# Patient Record
Sex: FEMALE | Race: WHITE | NOT HISPANIC OR LATINO | URBAN - METROPOLITAN AREA
[De-identification: names, ages, dates, MRNs, and addresses within clinical notes are randomized per-mention and may not be internally consistent; named-entity substitution may affect disease eponyms.]

---

## 2017-03-01 ENCOUNTER — EMERGENCY (EMERGENCY)
Facility: HOSPITAL | Age: 27
LOS: 1 days | Discharge: HOME | End: 2017-03-01
Attending: EMERGENCY MEDICINE | Admitting: EMERGENCY MEDICINE
Payer: MEDICAID

## 2017-03-01 VITALS
SYSTOLIC BLOOD PRESSURE: 134 MMHG | DIASTOLIC BLOOD PRESSURE: 83 MMHG | HEART RATE: 71 BPM | TEMPERATURE: 98 F | OXYGEN SATURATION: 100 % | RESPIRATION RATE: 16 BRPM

## 2017-03-01 PROCEDURE — 99283 EMERGENCY DEPT VISIT LOW MDM: CPT

## 2017-03-01 RX ORDER — FAMOTIDINE 10 MG/ML
1 INJECTION INTRAVENOUS
Qty: 20 | Refills: 0 | OUTPATIENT
Start: 2017-03-01

## 2017-03-01 RX ORDER — LIDOCAINE 4 G/100G
15 CREAM TOPICAL ONCE
Qty: 0 | Refills: 0 | Status: COMPLETED | OUTPATIENT
Start: 2017-03-01 | End: 2017-03-01

## 2017-03-01 RX ORDER — FAMOTIDINE 10 MG/ML
20 INJECTION INTRAVENOUS ONCE
Qty: 0 | Refills: 0 | Status: COMPLETED | OUTPATIENT
Start: 2017-03-01 | End: 2017-03-01

## 2017-03-01 RX ADMIN — FAMOTIDINE 20 MILLIGRAM(S): 10 INJECTION INTRAVENOUS at 16:10

## 2017-03-01 NOTE — ED ADULT TRIAGE NOTE - CHIEF COMPLAINT QUOTE
Pt c/o poor appetite x 2 weeks , dry mouth/throat , increasing thirst, and intermittent nausea  and discomfort since this week end.  Denies diarrhea, dizziness, blurry vision.  FS 89

## 2017-03-01 NOTE — ED PROVIDER NOTE - NS ED MD SCRIBE ATTENDING SCRIBE SECTIONS
REVIEW OF SYSTEMS/HISTORY OF PRESENT ILLNESS/DISPOSITION/PAST MEDICAL/SURGICAL/SOCIAL HISTORY/VITAL SIGNS( Pullset)/HIV/PHYSICAL EXAM

## 2017-03-01 NOTE — ED PROVIDER NOTE - PROGRESS NOTE DETAILS
MD CHO:  Patient seen and reassessed.  Patient symptomatically improved.  Patient able to ambulate w/o difficulty, is tolerating PO intake.  Discussed test results with patient, given copy of results. Patient verbalized understanding of hospital course and outpatient plans, has decision making capacity.  Will follow-up with primary care physician in the next 2 days; patient will call for an appointment. Will return to the ED if there is any worsening of symptoms.

## 2017-03-01 NOTE — ED PROVIDER NOTE - OBJECTIVE STATEMENT
27 yo F pt complaining of sore throat and decreased appetite x 2 weeks. States shes just not hungry. Reports her grandmother  2 weeks ago and since then has had decreased intake. Reports being stressed and anxious. Denies SI. Also c/o pain after eating - pain is in the throat/epigastric area. Also c/o chronic migraines, takes Excedrin and/or Motrin for multiple days per week x months. For epigastric/throat pain, no alleviating factors. No other associated symptoms. No emesis, no diarrhea, no fever. No sick contacts. No travel.

## 2017-03-01 NOTE — ED PROVIDER NOTE - MEDICAL DECISION MAKING DETAILS
27 yo F pt complaining of sore throat and decreased PO intake. Likely secondary to acute stress reaction due to death of family member vs reflux. Will start trial meds. PMD f/u. Likely GI referral.

## 2017-03-01 NOTE — ED PROVIDER NOTE - PLAN OF CARE
You were seen today for your abdominal pain.  It is likely due to gastritis, stomach inflammation.  Take the prescribed antacid as directed.  Avoid salty or spicy foods, alcohol, and caffeine.  Be sure to follow up with your primary care physician in 2-3 days for re-evaluation.  RETURN TO THE EMERGENCY DEPARTMENT IMMEDIATELY FOR SEVERE PAIN, IF YOU CANNOT EAT OR DRINK, DEVELOP CHEST PAIN, TROUBLE BREATHING, OR FOR ANY OTHER CONCERN.

## 2017-03-01 NOTE — ED PROVIDER NOTE - CARE PLAN
Principal Discharge DX:	Gastritis  Instructions for follow-up, activity and diet:	You were seen today for your abdominal pain.  It is likely due to gastritis, stomach inflammation.  Take the prescribed antacid as directed.  Avoid salty or spicy foods, alcohol, and caffeine.  Be sure to follow up with your primary care physician in 2-3 days for re-evaluation.  RETURN TO THE EMERGENCY DEPARTMENT IMMEDIATELY FOR SEVERE PAIN, IF YOU CANNOT EAT OR DRINK, DEVELOP CHEST PAIN, TROUBLE BREATHING, OR FOR ANY OTHER CONCERN.

## 2017-08-30 ENCOUNTER — EMERGENCY (EMERGENCY)
Facility: HOSPITAL | Age: 27
LOS: 1 days | Discharge: HOME | End: 2017-08-30
Attending: EMERGENCY MEDICINE | Admitting: EMERGENCY MEDICINE
Payer: COMMERCIAL

## 2017-08-30 VITALS
RESPIRATION RATE: 17 BRPM | SYSTOLIC BLOOD PRESSURE: 132 MMHG | OXYGEN SATURATION: 99 % | HEART RATE: 78 BPM | TEMPERATURE: 99 F | DIASTOLIC BLOOD PRESSURE: 74 MMHG

## 2017-08-30 VITALS
SYSTOLIC BLOOD PRESSURE: 158 MMHG | DIASTOLIC BLOOD PRESSURE: 100 MMHG | RESPIRATION RATE: 18 BRPM | OXYGEN SATURATION: 99 % | TEMPERATURE: 98 F | HEART RATE: 90 BPM

## 2017-08-30 DIAGNOSIS — Z90.49 ACQUIRED ABSENCE OF OTHER SPECIFIED PARTS OF DIGESTIVE TRACT: Chronic | ICD-10-CM

## 2017-08-30 LAB
ALBUMIN SERPL ELPH-MCNC: 4.4 G/DL — SIGNIFICANT CHANGE UP (ref 3.3–5)
ALP SERPL-CCNC: 72 U/L — SIGNIFICANT CHANGE UP (ref 40–120)
ALT FLD-CCNC: 19 U/L — SIGNIFICANT CHANGE UP (ref 4–33)
AST SERPL-CCNC: 18 U/L — SIGNIFICANT CHANGE UP (ref 4–32)
BASOPHILS # BLD AUTO: 0.06 K/UL — SIGNIFICANT CHANGE UP (ref 0–0.2)
BASOPHILS NFR BLD AUTO: 0.4 % — SIGNIFICANT CHANGE UP (ref 0–2)
BILIRUB SERPL-MCNC: 0.3 MG/DL — SIGNIFICANT CHANGE UP (ref 0.2–1.2)
BUN SERPL-MCNC: 8 MG/DL — SIGNIFICANT CHANGE UP (ref 7–23)
CALCIUM SERPL-MCNC: 9.7 MG/DL — SIGNIFICANT CHANGE UP (ref 8.4–10.5)
CHLORIDE SERPL-SCNC: 101 MMOL/L — SIGNIFICANT CHANGE UP (ref 98–107)
CO2 SERPL-SCNC: 25 MMOL/L — SIGNIFICANT CHANGE UP (ref 22–31)
CREAT SERPL-MCNC: 0.74 MG/DL — SIGNIFICANT CHANGE UP (ref 0.5–1.3)
EOSINOPHIL # BLD AUTO: 0.14 K/UL — SIGNIFICANT CHANGE UP (ref 0–0.5)
EOSINOPHIL NFR BLD AUTO: 1 % — SIGNIFICANT CHANGE UP (ref 0–6)
GLUCOSE SERPL-MCNC: 85 MG/DL — SIGNIFICANT CHANGE UP (ref 70–99)
HCT VFR BLD CALC: 44.5 % — SIGNIFICANT CHANGE UP (ref 34.5–45)
HGB BLD-MCNC: 14.7 G/DL — SIGNIFICANT CHANGE UP (ref 11.5–15.5)
IMM GRANULOCYTES # BLD AUTO: 0.12 # — SIGNIFICANT CHANGE UP
IMM GRANULOCYTES NFR BLD AUTO: 0.8 % — SIGNIFICANT CHANGE UP (ref 0–1.5)
LYMPHOCYTES # BLD AUTO: 22.1 % — SIGNIFICANT CHANGE UP (ref 13–44)
LYMPHOCYTES # BLD AUTO: 3.2 K/UL — SIGNIFICANT CHANGE UP (ref 1–3.3)
MCHC RBC-ENTMCNC: 26.7 PG — LOW (ref 27–34)
MCHC RBC-ENTMCNC: 33 % — SIGNIFICANT CHANGE UP (ref 32–36)
MCV RBC AUTO: 80.9 FL — SIGNIFICANT CHANGE UP (ref 80–100)
MONOCYTES # BLD AUTO: 0.61 K/UL — SIGNIFICANT CHANGE UP (ref 0–0.9)
MONOCYTES NFR BLD AUTO: 4.2 % — SIGNIFICANT CHANGE UP (ref 2–14)
NEUTROPHILS # BLD AUTO: 10.34 K/UL — HIGH (ref 1.8–7.4)
NEUTROPHILS NFR BLD AUTO: 71.5 % — SIGNIFICANT CHANGE UP (ref 43–77)
NRBC # FLD: 0 — SIGNIFICANT CHANGE UP
PLATELET # BLD AUTO: 317 K/UL — SIGNIFICANT CHANGE UP (ref 150–400)
PMV BLD: 10.3 FL — SIGNIFICANT CHANGE UP (ref 7–13)
POTASSIUM SERPL-MCNC: 4.5 MMOL/L — SIGNIFICANT CHANGE UP (ref 3.5–5.3)
POTASSIUM SERPL-SCNC: 4.5 MMOL/L — SIGNIFICANT CHANGE UP (ref 3.5–5.3)
PROT SERPL-MCNC: 7.3 G/DL — SIGNIFICANT CHANGE UP (ref 6–8.3)
RBC # BLD: 5.5 M/UL — HIGH (ref 3.8–5.2)
RBC # FLD: 13.1 % — SIGNIFICANT CHANGE UP (ref 10.3–14.5)
SODIUM SERPL-SCNC: 140 MMOL/L — SIGNIFICANT CHANGE UP (ref 135–145)
TSH SERPL-MCNC: 1.11 UIU/ML — SIGNIFICANT CHANGE UP (ref 0.27–4.2)
WBC # BLD: 14.47 K/UL — HIGH (ref 3.8–10.5)
WBC # FLD AUTO: 14.47 K/UL — HIGH (ref 3.8–10.5)

## 2017-08-30 PROCEDURE — 93010 ELECTROCARDIOGRAM REPORT: CPT

## 2017-08-30 PROCEDURE — 99285 EMERGENCY DEPT VISIT HI MDM: CPT | Mod: 25

## 2017-08-30 RX ORDER — KETOROLAC TROMETHAMINE 30 MG/ML
15 SYRINGE (ML) INJECTION ONCE
Qty: 0 | Refills: 0 | Status: DISCONTINUED | OUTPATIENT
Start: 2017-08-30 | End: 2017-08-30

## 2017-08-30 RX ORDER — METOCLOPRAMIDE HCL 10 MG
10 TABLET ORAL ONCE
Qty: 0 | Refills: 0 | Status: COMPLETED | OUTPATIENT
Start: 2017-08-30 | End: 2017-08-30

## 2017-08-30 RX ORDER — SODIUM CHLORIDE 9 MG/ML
1000 INJECTION INTRAMUSCULAR; INTRAVENOUS; SUBCUTANEOUS ONCE
Qty: 0 | Refills: 0 | Status: COMPLETED | OUTPATIENT
Start: 2017-08-30 | End: 2017-08-30

## 2017-08-30 RX ADMIN — SODIUM CHLORIDE 1000 MILLILITER(S): 9 INJECTION INTRAMUSCULAR; INTRAVENOUS; SUBCUTANEOUS at 19:28

## 2017-08-30 RX ADMIN — Medication 10 MILLIGRAM(S): at 19:28

## 2017-08-30 NOTE — ED PROVIDER NOTE - OBJECTIVE STATEMENT
18:36 Emery att: 27F c/o 1 wk ha, dizzy, lighthead, wk, ha and palpitations. Past week 1 dizziness, specifies lighthead not like vertigo which she had in past. Also notes generalized weakness, intermitt frontal ha which she points to max sinus. Past one week occ non-exertional palpitations. Denies fever, chills, n, v, sob, exertional sx. HEART zero PERC zero SMOKING denies FamHx neg mi < 40, neg sudden death. Denies pleurisy, hemoptysis, tachypnea, fever, cough, chills, rhinorrhea, sneezing. PMH pcos, gallstones, anxiety PSH pamela MED x ALL nkda  denies

## 2017-08-30 NOTE — ED ADULT TRIAGE NOTE - CHIEF COMPLAINT QUOTE
pt presents c/o generalized weakness, dizzy and imbalanced, with feelings of anxiety, and shaky with intermittent palpitations and sob x1 week. pt states "I think my sugar is low". pt reports feeling off balance x years, and tested negative for vertigo. now endorses she is unable to work or function in her daily life. denies any additional symptoms. FS: 74 PMHX: PCOS, degenerative joint disease in neck

## 2017-08-30 NOTE — ED PROVIDER NOTE - MEDICAL DECISION MAKING DETAILS
Palpitations, ekg sinus nl axis neg st abnl PLAN check electrolytes and tsh  HA, neg sinus tenderness, cranial nerves intact PLAN reglan, toradol, fluids

## 2017-09-03 ENCOUNTER — TRANSCRIPTION ENCOUNTER (OUTPATIENT)
Age: 27
End: 2017-09-03

## 2018-02-08 ENCOUNTER — TRANSCRIPTION ENCOUNTER (OUTPATIENT)
Age: 28
End: 2018-02-08

## 2018-02-08 ENCOUNTER — EMERGENCY (EMERGENCY)
Facility: HOSPITAL | Age: 28
LOS: 1 days | Discharge: HOME | End: 2018-02-08
Attending: EMERGENCY MEDICINE | Admitting: EMERGENCY MEDICINE
Payer: COMMERCIAL

## 2018-02-08 VITALS
DIASTOLIC BLOOD PRESSURE: 85 MMHG | HEART RATE: 86 BPM | SYSTOLIC BLOOD PRESSURE: 151 MMHG | OXYGEN SATURATION: 99 % | TEMPERATURE: 98 F | RESPIRATION RATE: 20 BRPM

## 2018-02-08 VITALS
TEMPERATURE: 98 F | DIASTOLIC BLOOD PRESSURE: 92 MMHG | HEART RATE: 84 BPM | OXYGEN SATURATION: 100 % | SYSTOLIC BLOOD PRESSURE: 157 MMHG | RESPIRATION RATE: 15 BRPM

## 2018-02-08 DIAGNOSIS — Z90.49 ACQUIRED ABSENCE OF OTHER SPECIFIED PARTS OF DIGESTIVE TRACT: Chronic | ICD-10-CM

## 2018-02-08 PROCEDURE — 71046 X-RAY EXAM CHEST 2 VIEWS: CPT | Mod: 26

## 2018-02-08 PROCEDURE — 99284 EMERGENCY DEPT VISIT MOD MDM: CPT

## 2018-02-08 RX ORDER — IBUPROFEN 200 MG
600 TABLET ORAL ONCE
Qty: 0 | Refills: 0 | Status: COMPLETED | OUTPATIENT
Start: 2018-02-08 | End: 2018-02-08

## 2018-02-08 NOTE — ED PROVIDER NOTE - OBJECTIVE STATEMENT
27F pmh PCOS p/w 1 week of chest tightness and feelins shortness of breath and 2 days of episodes of intermittent "achey" chest pains.  Pain is not pleuritic.  Pt notices increased discomfort when laying on left side.  Denies recent travel.  No hormone use.  Denies fever, chills, abd pain, nausea/vomiting/diarrhea, hemoptysis.  No leg swelling.  - Edith Whitt, DO 27F pmh PCOS p/w 1 week of chest tightness and feeling shortness of breath d/t pain, and 2 days of episodes of intermittent "achey" chest pains.  Pain is not pleuritic.  Pt notices increased discomfort when laying on left side and turning.  Denies recent travel.  No hormone use.  Denies fever, chills, abd pain, nausea/vomiting/diarrhea, hemoptysis.  No leg swelling. No recent injuries.  Well appearing.

## 2018-02-08 NOTE — ED PROVIDER NOTE - CARE PLAN
Principal Discharge DX:	Costochondral chest pain  Assessment and plan of treatment:	- Take Motrin 400-600mg every 6 hrs as needed for pain. Take with food

## 2018-02-08 NOTE — ED ADULT TRIAGE NOTE - CHIEF COMPLAINT QUOTE
Pt with co sob since last week with pain under left breast area since yesterday pain radiated to upper back. pt states feels chest tightness.

## 2018-02-08 NOTE — ED ADULT NURSE NOTE - OBJECTIVE STATEMENT
pt. received in room 26 ,, A&Ox3 w/ no PMH c/o left sided chest pain with sob for a couple of days. Pt. appears calm , with even non-labored resp. Pt. Vitals as noted, and in no acute distress. As per MD no labs needed at this moment. Awaiting x-ray. Will continue to monitor while in the ED.

## 2018-02-08 NOTE — ED PROVIDER NOTE - MEDICAL DECISION MAKING DETAILS
27F pmh pcos p/w chest tightness and shortness of breath for 1 week.  NSR of EKG.  PERC negative.  Will obtain cxr and reassess.  - Edith Whitt DO 27F pmh pcos p/w pain c/w constochondritis vs other MSK pain.  No known cardiac risk factors, PERC negative.  Will ECG, provide sx tx and dx.

## 2018-02-08 NOTE — ED PROVIDER NOTE - ATTENDING CONTRIBUTION TO CARE
Attending Attestation: Dr. Macedo  I have personally performed a history and physical examination of the patient and discussed management with the resident as well as the patient.  I reviewed the resident's note and agree with the documented findings and plan of care.  I have authored and modified critical sections of the Provider Note, including but not limited to HPI, Physical Exam and MDM. 27F pmh pcos p/w pain c/w constochondritis vs other MSK pain.  No known cardiac risk factors, PERC negative.  Will ECG, provide sx tx and dx.

## 2018-02-08 NOTE — ED PROVIDER NOTE - CARDIAC, MLM
Normal rate, regular rhythm.  Heart sounds S1, S2.  No murmurs, rubs or gallops. Normal rate, regular rhythm.  Heart sounds S1, S2.  No murmurs, rubs or gallops.  TTP left sternal border.

## 2018-02-09 RX ADMIN — Medication 600 MILLIGRAM(S): at 00:00

## 2020-02-26 ENCOUNTER — HOSPITAL ENCOUNTER (EMERGENCY)
Facility: HOSPITAL | Age: 30
Discharge: HOME | End: 2020-02-26
Attending: EMERGENCY MEDICINE | Admitting: EMERGENCY MEDICINE

## 2020-02-26 DIAGNOSIS — R03.0 TRANSIENT HYPERTENSION: ICD-10-CM

## 2020-02-26 DIAGNOSIS — G43.009 ATYPICAL MIGRAINE: Primary | ICD-10-CM

## 2020-02-26 LAB
ALBUMIN SERPL BCP-MCNC: 3.7 G/DL (ref 3.5–5)
ALP SERPL-CCNC: 85 U/L (ref 46–116)
ALT SERPL W P-5'-P-CCNC: 43 U/L (ref 12–78)
ANION GAP SERPL CALCULATED.3IONS-SCNC: 10 MMOL/L (ref 4–13)
AST SERPL W P-5'-P-CCNC: 25 U/L (ref 5–45)
BACTERIA UR QL AUTO: ABNORMAL /HPF
BASOPHILS # BLD AUTO: 0.06 THOUSANDS/ΜL (ref 0–0.1)
BASOPHILS NFR BLD AUTO: 1 % (ref 0–1)
BILIRUB SERPL-MCNC: 0.21 MG/DL (ref 0.2–1)
BILIRUB UR QL STRIP: NEGATIVE
BUN SERPL-MCNC: 9 MG/DL (ref 5–25)
CALCIUM SERPL-MCNC: 9.1 MG/DL (ref 8.3–10.1)
CHLORIDE SERPL-SCNC: 102 MMOL/L (ref 100–108)
CLARITY UR: CLEAR
CO2 SERPL-SCNC: 27 MMOL/L (ref 21–32)
COLOR UR: YELLOW
CREAT SERPL-MCNC: 0.89 MG/DL (ref 0.6–1.3)
EOSINOPHIL # BLD AUTO: 0.13 THOUSAND/ΜL (ref 0–0.61)
EOSINOPHIL NFR BLD AUTO: 1 % (ref 0–6)
ERYTHROCYTE [DISTWIDTH] IN BLOOD BY AUTOMATED COUNT: 12.8 % (ref 11.6–15.1)
EXT PREG TEST URINE: NEGATIVE
EXT. CONTROL ED NAV: NORMAL
GFR SERPL CREATININE-BSD FRML MDRD: 88 ML/MIN/1.73SQ M
GLUCOSE SERPL-MCNC: 190 MG/DL (ref 65–140)
GLUCOSE UR STRIP-MCNC: ABNORMAL MG/DL
HCT VFR BLD AUTO: 43.9 % (ref 34.8–46.1)
HGB BLD-MCNC: 14.6 G/DL (ref 11.5–15.4)
HGB UR QL STRIP.AUTO: ABNORMAL
IMM GRANULOCYTES # BLD AUTO: 0.1 THOUSAND/UL (ref 0–0.2)
IMM GRANULOCYTES NFR BLD AUTO: 1 % (ref 0–2)
KETONES UR STRIP-MCNC: NEGATIVE MG/DL
LEUKOCYTE ESTERASE UR QL STRIP: NEGATIVE
LYMPHOCYTES # BLD AUTO: 3.55 THOUSANDS/ΜL (ref 0.6–4.47)
LYMPHOCYTES NFR BLD AUTO: 30 % (ref 14–44)
MCH RBC QN AUTO: 27.2 PG (ref 26.8–34.3)
MCHC RBC AUTO-ENTMCNC: 33.3 G/DL (ref 31.4–37.4)
MCV RBC AUTO: 82 FL (ref 82–98)
MONOCYTES # BLD AUTO: 0.47 THOUSAND/ΜL (ref 0.17–1.22)
MONOCYTES NFR BLD AUTO: 4 % (ref 4–12)
NEUTROPHILS # BLD AUTO: 7.58 THOUSANDS/ΜL (ref 1.85–7.62)
NEUTS SEG NFR BLD AUTO: 63 % (ref 43–75)
NITRITE UR QL STRIP: NEGATIVE
NON-SQ EPI CELLS URNS QL MICRO: ABNORMAL /HPF
NRBC BLD AUTO-RTO: 0 /100 WBCS
PH UR STRIP.AUTO: 6 [PH]
PLATELET # BLD AUTO: 306 THOUSANDS/UL (ref 149–390)
PMV BLD AUTO: 9.8 FL (ref 8.9–12.7)
POTASSIUM SERPL-SCNC: 3.7 MMOL/L (ref 3.5–5.3)
PROT SERPL-MCNC: 7.7 G/DL (ref 6.4–8.2)
PROT UR STRIP-MCNC: NEGATIVE MG/DL
RBC # BLD AUTO: 5.36 MILLION/UL (ref 3.81–5.12)
RBC #/AREA URNS AUTO: ABNORMAL /HPF
SODIUM SERPL-SCNC: 139 MMOL/L (ref 136–145)
SP GR UR STRIP.AUTO: >=1.03 (ref 1–1.03)
TROPONIN I SERPL-MCNC: <0.02 NG/ML
UROBILINOGEN UR QL STRIP.AUTO: 0.2 E.U./DL
WBC # BLD AUTO: 11.89 THOUSAND/UL (ref 4.31–10.16)
WBC #/AREA URNS AUTO: ABNORMAL /HPF

## 2020-02-26 PROCEDURE — 81025 URINE PREGNANCY TEST: CPT | Performed by: EMERGENCY MEDICINE

## 2020-02-26 PROCEDURE — 36415 COLL VENOUS BLD VENIPUNCTURE: CPT

## 2020-02-26 PROCEDURE — 80053 COMPREHEN METABOLIC PANEL: CPT | Performed by: EMERGENCY MEDICINE

## 2020-02-26 PROCEDURE — 84484 ASSAY OF TROPONIN QUANT: CPT | Performed by: EMERGENCY MEDICINE

## 2020-02-26 PROCEDURE — 99284 EMERGENCY DEPT VISIT MOD MDM: CPT

## 2020-02-26 PROCEDURE — 99284 EMERGENCY DEPT VISIT MOD MDM: CPT | Performed by: EMERGENCY MEDICINE

## 2020-02-26 PROCEDURE — 85025 COMPLETE CBC W/AUTO DIFF WBC: CPT | Performed by: EMERGENCY MEDICINE

## 2020-02-26 PROCEDURE — 81001 URINALYSIS AUTO W/SCOPE: CPT | Performed by: EMERGENCY MEDICINE

## 2020-02-26 PROCEDURE — 93005 ELECTROCARDIOGRAM TRACING: CPT

## 2020-02-27 VITALS
HEART RATE: 82 BPM | SYSTOLIC BLOOD PRESSURE: 138 MMHG | TEMPERATURE: 98.5 F | WEIGHT: 293 LBS | BODY MASS INDEX: 44.41 KG/M2 | RESPIRATION RATE: 20 BRPM | HEIGHT: 68 IN | DIASTOLIC BLOOD PRESSURE: 90 MMHG | OXYGEN SATURATION: 100 %

## 2020-02-27 NOTE — ED PROVIDER NOTES
History  Chief Complaint   Patient presents with    Hypertension     Presents for eval of htn assessed via Urgent care pta (164/100) - reports intermittent HA w/ visual disturbance/lightheadedness/blurred vision  Hx migraines, but "feels different"  Additional c/o "lump" to right chest/upper back of unknown etiology  History provided by:  Patient   used: No    33 y/o female with hx of migraines sent from urgent care for further eval of headache and HTN  Patient reported about a day of intermittent headache, some blurred vision and lightheadedness  Moderate intensity, diffuse pain  No N/V, fever, chills, neck pain, trauma  Notes somewhat different than her usual migraines  Noted to be hypertensive at urgent care  Hypertensive on arrival here  Likely secondary to headache given age and prior medical history  Will r/o end-organ damage, re-eval     None       Past Medical History:   Diagnosis Date    Migraine     PCOS (polycystic ovarian syndrome)        Past Surgical History:   Procedure Laterality Date    CHOLECYSTECTOMY         History reviewed  No pertinent family history  I have reviewed and agree with the history as documented  E-Cigarette/Vaping     E-Cigarette/Vaping Substances    Nicotine No     Flavoring No      Social History     Tobacco Use    Smoking status: Never Smoker    Smokeless tobacco: Never Used   Substance Use Topics    Alcohol use: Never     Frequency: Never    Drug use: Never       Review of Systems   Constitutional: Negative for activity change, appetite change, fatigue and fever  Eyes: Positive for photophobia and visual disturbance  Respiratory: Negative for chest tightness and shortness of breath  Cardiovascular: Negative for chest pain and palpitations  Gastrointestinal: Negative for nausea and vomiting  Musculoskeletal: Negative for back pain, neck pain and neck stiffness  Skin: Negative for color change     Neurological: Positive for light-headedness and headaches  Negative for dizziness and weakness  Psychiatric/Behavioral: Negative for confusion  All other systems reviewed and are negative  Physical Exam  Physical Exam   Constitutional: She is oriented to person, place, and time  She appears well-developed and well-nourished  No distress  HENT:   Head: Normocephalic  Mouth/Throat: Oropharynx is clear and moist    Eyes: Pupils are equal, round, and reactive to light  EOM are normal    Neck: Normal range of motion  Neck supple  Cardiovascular: Normal rate and regular rhythm  Pulmonary/Chest: Effort normal  No respiratory distress  Neurological: She is alert and oriented to person, place, and time  No cranial nerve deficit or sensory deficit  She exhibits normal muscle tone  Coordination normal    Skin: Skin is warm and dry  Psychiatric: She has a normal mood and affect  Her behavior is normal    Nursing note and vitals reviewed        Vital Signs  ED Triage Vitals [02/26/20 2031]   Temperature Pulse Respirations Blood Pressure SpO2   98 5 °F (36 9 °C) (!) 110 20 (!) 201/101 95 %      Temp Source Heart Rate Source Patient Position - Orthostatic VS BP Location FiO2 (%)   Oral Monitor Sitting Left arm --      Pain Score       8           Vitals:    02/26/20 2031 02/26/20 2146 02/26/20 2213 02/26/20 2350   BP: (!) 201/101 (!) 185/92 (!) 136/110 138/90   Pulse: (!) 110 101 88 82   Patient Position - Orthostatic VS: Sitting Sitting Sitting Lying         Visual Acuity      ED Medications  Medications - No data to display    Diagnostic Studies  Results Reviewed     Procedure Component Value Units Date/Time    Urine Microscopic [[id number]]  (Abnormal) Collected:  02/26/20 2201    Lab Status:  Final result Specimen:  Urine, Clean Catch Updated:  02/26/20 2231     RBC, UA 0-1 /hpf      WBC, UA 1-2 /hpf      Epithelial Cells Occasional /hpf      Bacteria, UA None Seen /hpf     Troponin I [[id number]]  (Normal) Collected:  02/26/20 2159 Lab Status:  Final result Specimen:  Blood from Arm, Right Updated:  02/26/20 2226     Troponin I <0 02 ng/mL     Comprehensive metabolic panel [[id number]]  (Abnormal) Collected:  02/26/20 2159    Lab Status:  Final result Specimen:  Blood from Arm, Right Updated:  02/26/20 2226     Sodium 139 mmol/L      Potassium 3 7 mmol/L      Chloride 102 mmol/L      CO2 27 mmol/L      ANION GAP 10 mmol/L      BUN 9 mg/dL      Creatinine 0 89 mg/dL      Glucose 190 mg/dL      Calcium 9 1 mg/dL      AST 25 U/L      ALT 43 U/L      Alkaline Phosphatase 85 U/L      Total Protein 7 7 g/dL      Albumin 3 7 g/dL      Total Bilirubin 0 21 mg/dL      eGFR 88 ml/min/1 73sq m     Narrative:       National Kidney Disease Foundation guidelines for Chronic Kidney Disease (CKD):     Stage 1 with normal or high GFR (GFR > 90 mL/min/1 73 square meters)    Stage 2 Mild CKD (GFR = 60-89 mL/min/1 73 square meters)    Stage 3A Moderate CKD (GFR = 45-59 mL/min/1 73 square meters)    Stage 3B Moderate CKD (GFR = 30-44 mL/min/1 73 square meters)    Stage 4 Severe CKD (GFR = 15-29 mL/min/1 73 square meters)    Stage 5 End Stage CKD (GFR <15 mL/min/1 73 square meters)  Note: GFR calculation is accurate only with a steady state creatinine    UA w Reflex to Microscopic w Reflex to Culture [[id number]]  (Abnormal) Collected:  02/26/20 2201    Lab Status:  Final result Specimen:  Urine, Clean Catch Updated:  02/26/20 2213     Color, UA Yellow     Clarity, UA Clear     Specific Gravity, UA >=1 030     pH, UA 6 0     Leukocytes, UA Negative     Nitrite, UA Negative     Protein, UA Negative mg/dl      Glucose,  (1/10%) mg/dl      Ketones, UA Negative mg/dl      Urobilinogen, UA 0 2 E U /dl      Bilirubin, UA Negative     Blood, UA Small    CBC and differential [[id number]]  (Abnormal) Collected:  02/26/20 2200    Lab Status:  Final result Specimen:  Blood from Arm, Right Updated:  02/26/20 2205     WBC 11 89 Thousand/uL      RBC 5 36 Million/uL Hemoglobin 14 6 g/dL      Hematocrit 43 9 %      MCV 82 fL      MCH 27 2 pg      MCHC 33 3 g/dL      RDW 12 8 %      MPV 9 8 fL      Platelets 582 Thousands/uL      nRBC 0 /100 WBCs      Neutrophils Relative 63 %      Immat GRANS % 1 %      Lymphocytes Relative 30 %      Monocytes Relative 4 %      Eosinophils Relative 1 %      Basophils Relative 1 %      Neutrophils Absolute 7 58 Thousands/µL      Immature Grans Absolute 0 10 Thousand/uL      Lymphocytes Absolute 3 55 Thousands/µL      Monocytes Absolute 0 47 Thousand/µL      Eosinophils Absolute 0 13 Thousand/µL      Basophils Absolute 0 06 Thousands/µL     POCT pregnancy, urine [[id number]]  (Normal) Resulted:  02/26/20 2200    Lab Status:  Final result Updated:  02/26/20 2200     EXT PREG TEST UR (Ref: Negative) negative     Control valid                 No orders to display              Procedures  ECG 12 Lead Documentation Only  Date/Time: 2/26/2020 11:47 PM  Performed by: Hany Haddad MD  Authorized by: Hany Haddad MD     Indications / Diagnosis:  HTN  ECG reviewed by me, the ED Provider: yes    Patient location:  ED  Previous ECG:     Previous ECG:  Unavailable  Rate:     ECG rate:  82  Rhythm:     Rhythm: sinus rhythm    Ectopy:     Ectopy: none    QRS:     QRS axis:  Normal  Conduction:     Conduction: normal    ST segments:     ST segments:  Normal  T waves:     T waves: normal               ED Course                               MDM  Number of Diagnoses or Management Options  Atypical migraine: new and requires workup  Transient hypertension: new and requires workup  Diagnosis management comments: 35 y/o female with intermittent headache somewhat different than her usual migraine and hypertensive reading at urgent care referred for further eval  Doing well here  Spontaneous improvement in BP  Headache minimal  Patient feels well for discharge  No evidence of end organ damage         Amount and/or Complexity of Data Reviewed  Clinical lab tests: ordered and reviewed  Independent visualization of images, tracings, or specimens: yes    Patient Progress  Patient progress: improved        Disposition  Final diagnoses:   Atypical migraine   Transient hypertension     Time reflects when diagnosis was documented in both MDM as applicable and the Disposition within this note     Time User Action Codes Description Comment    2/26/2020 11:39 PM Shana Payan Add [G43 009] Atypical migraine     2/26/2020 11:39 PM Annmarie Tuttle MANE Add [R03 0] Transient hypertension       ED Disposition     ED Disposition Condition Date/Time Comment    Discharge Stable Wed Feb 26, 2020 11:39 PM Kathy Diaz discharge to home/self care  Follow-up Information     Follow up With Specialties Details Why Contact Info Additional [address] [facility]   [address] [facility] [address]  [address] [facility] [address], [place], [place] [address]   [phone]          There are no discharge medications for this patient  No discharge procedures on file      PDMP Review     None          ED Provider  Electronically Signed by           Anjel Newman MD  03/14/20 5195

## 2020-02-29 LAB
ATRIAL RATE: 82 BPM
P AXIS: 22 DEGREES
PR INTERVAL: 172 MS
QRS AXIS: 15 DEGREES
QRSD INTERVAL: 70 MS
QT INTERVAL: 344 MS
QTC INTERVAL: 401 MS
T WAVE AXIS: 18 DEGREES
VENTRICULAR RATE: 82 BPM

## 2020-02-29 PROCEDURE — 93010 ELECTROCARDIOGRAM REPORT: CPT | Performed by: INTERNAL MEDICINE

## 2020-04-24 ENCOUNTER — OFFICE VISIT (OUTPATIENT)
Dept: FAMILY MEDICINE CLINIC | Facility: CLINIC | Age: 30
End: 2020-04-24
Payer: COMMERCIAL

## 2020-04-24 VITALS
HEART RATE: 98 BPM | TEMPERATURE: 99.4 F | DIASTOLIC BLOOD PRESSURE: 92 MMHG | OXYGEN SATURATION: 98 % | HEIGHT: 69 IN | SYSTOLIC BLOOD PRESSURE: 140 MMHG | BODY MASS INDEX: 43.4 KG/M2 | RESPIRATION RATE: 20 BRPM | WEIGHT: 293 LBS

## 2020-04-24 DIAGNOSIS — Z00.00 ANNUAL PHYSICAL EXAM: Primary | ICD-10-CM

## 2020-04-24 DIAGNOSIS — R42 VERTIGO: ICD-10-CM

## 2020-04-24 DIAGNOSIS — I10 ESSENTIAL HYPERTENSION: ICD-10-CM

## 2020-04-24 DIAGNOSIS — R42 DIZZINESS: ICD-10-CM

## 2020-04-24 DIAGNOSIS — E66.01 MORBID (SEVERE) OBESITY DUE TO EXCESS CALORIES (HCC): ICD-10-CM

## 2020-04-24 DIAGNOSIS — K21.9 GASTROESOPHAGEAL REFLUX DISEASE WITHOUT ESOPHAGITIS: ICD-10-CM

## 2020-04-24 DIAGNOSIS — R40.0 DAYTIME SLEEPINESS: ICD-10-CM

## 2020-04-24 PROBLEM — G43.019 INTRACTABLE MIGRAINE WITHOUT AURA AND WITHOUT STATUS MIGRAINOSUS: Status: ACTIVE | Noted: 2020-04-24

## 2020-04-24 PROBLEM — E28.2 PCOS (POLYCYSTIC OVARIAN SYNDROME): Status: ACTIVE | Noted: 2020-04-24

## 2020-04-24 PROCEDURE — 4010F ACE/ARB THERAPY RXD/TAKEN: CPT | Performed by: INTERNAL MEDICINE

## 2020-04-24 PROCEDURE — 99385 PREV VISIT NEW AGE 18-39: CPT | Performed by: FAMILY MEDICINE

## 2020-04-24 RX ORDER — OLMESARTAN MEDOXOMIL 5 MG/1
5 TABLET ORAL DAILY
Qty: 30 TABLET | Refills: 0 | Status: SHIPPED | OUTPATIENT
Start: 2020-04-24 | End: 2022-03-02 | Stop reason: ALTCHOICE

## 2020-04-24 RX ORDER — MECLIZINE HYDROCHLORIDE 25 MG/1
25 TABLET ORAL 3 TIMES DAILY PRN
Qty: 90 TABLET | Refills: 0 | Status: SHIPPED | OUTPATIENT
Start: 2020-04-24 | End: 2022-03-02 | Stop reason: ALTCHOICE

## 2020-04-24 RX ORDER — PANTOPRAZOLE SODIUM 20 MG/1
20 TABLET, DELAYED RELEASE ORAL
Qty: 30 TABLET | Refills: 5 | Status: SHIPPED | OUTPATIENT
Start: 2020-04-24

## 2020-04-27 LAB
ALBUMIN SERPL-MCNC: 4.3 G/DL (ref 3.9–5)
ALBUMIN/GLOB SERPL: 1.7 {RATIO} (ref 1.2–2.2)
ALP SERPL-CCNC: 71 IU/L (ref 39–117)
ALT SERPL-CCNC: 24 IU/L (ref 0–32)
AST SERPL-CCNC: 23 IU/L (ref 0–40)
BASOPHILS # BLD AUTO: 0.1 X10E3/UL (ref 0–0.2)
BASOPHILS NFR BLD AUTO: 1 %
BILIRUB SERPL-MCNC: 0.2 MG/DL (ref 0–1.2)
BUN SERPL-MCNC: 11 MG/DL (ref 6–20)
BUN/CREAT SERPL: 16 (ref 9–23)
CALCIUM SERPL-MCNC: 9.5 MG/DL (ref 8.7–10.2)
CHLORIDE SERPL-SCNC: 99 MMOL/L (ref 96–106)
CHOLEST SERPL-MCNC: 209 MG/DL (ref 100–199)
CO2 SERPL-SCNC: 24 MMOL/L (ref 20–29)
CREAT SERPL-MCNC: 0.67 MG/DL (ref 0.57–1)
EOSINOPHIL # BLD AUTO: 0.2 X10E3/UL (ref 0–0.4)
EOSINOPHIL NFR BLD AUTO: 2 %
ERYTHROCYTE [DISTWIDTH] IN BLOOD BY AUTOMATED COUNT: 13.3 % (ref 11.7–15.4)
GLOBULIN SER-MCNC: 2.6 G/DL (ref 1.5–4.5)
GLUCOSE SERPL-MCNC: 163 MG/DL (ref 65–99)
HBA1C MFR BLD: 8.5 % (ref 4.8–5.6)
HCT VFR BLD AUTO: 43.7 % (ref 34–46.6)
HDLC SERPL-MCNC: 45 MG/DL
HGB BLD-MCNC: 14 G/DL (ref 11.1–15.9)
IMM GRANULOCYTES # BLD: 0.2 X10E3/UL (ref 0–0.1)
IMM GRANULOCYTES NFR BLD: 2 %
LDLC SERPL CALC-MCNC: 140 MG/DL (ref 0–99)
LYMPHOCYTES # BLD AUTO: 4.1 X10E3/UL (ref 0.7–3.1)
LYMPHOCYTES NFR BLD AUTO: 37 %
MCH RBC QN AUTO: 26.5 PG (ref 26.6–33)
MCHC RBC AUTO-ENTMCNC: 32 G/DL (ref 31.5–35.7)
MCV RBC AUTO: 83 FL (ref 79–97)
MONOCYTES # BLD AUTO: 0.5 X10E3/UL (ref 0.1–0.9)
MONOCYTES NFR BLD AUTO: 4 %
NEUTROPHILS # BLD AUTO: 6.1 X10E3/UL (ref 1.4–7)
NEUTROPHILS NFR BLD AUTO: 54 %
PLATELET # BLD AUTO: 291 X10E3/UL (ref 150–450)
POTASSIUM SERPL-SCNC: 3.9 MMOL/L (ref 3.5–5.2)
PROT SERPL-MCNC: 6.9 G/DL (ref 6–8.5)
RBC # BLD AUTO: 5.28 X10E6/UL (ref 3.77–5.28)
SL AMB EGFR AFRICAN AMERICAN: 137 ML/MIN/1.73
SL AMB EGFR NON AFRICAN AMERICAN: 119 ML/MIN/1.73
SODIUM SERPL-SCNC: 140 MMOL/L (ref 134–144)
TRIGL SERPL-MCNC: 120 MG/DL (ref 0–149)
TSH SERPL DL<=0.005 MIU/L-ACNC: 1.75 UIU/ML (ref 0.45–4.5)
WBC # BLD AUTO: 11.1 X10E3/UL (ref 3.4–10.8)

## 2020-04-27 PROCEDURE — 3052F HG A1C>EQUAL 8.0%<EQUAL 9.0%: CPT | Performed by: INTERNAL MEDICINE

## 2020-04-29 ENCOUNTER — TELEMEDICINE (OUTPATIENT)
Dept: FAMILY MEDICINE CLINIC | Facility: CLINIC | Age: 30
End: 2020-04-29
Payer: COMMERCIAL

## 2020-04-29 VITALS
BODY MASS INDEX: 43.4 KG/M2 | HEART RATE: 72 BPM | SYSTOLIC BLOOD PRESSURE: 131 MMHG | HEIGHT: 69 IN | WEIGHT: 293 LBS | DIASTOLIC BLOOD PRESSURE: 88 MMHG | TEMPERATURE: 98.1 F

## 2020-04-29 DIAGNOSIS — E11.9 TYPE 2 DIABETES MELLITUS WITHOUT COMPLICATION, WITHOUT LONG-TERM CURRENT USE OF INSULIN (HCC): Primary | ICD-10-CM

## 2020-04-29 DIAGNOSIS — E66.01 MORBID (SEVERE) OBESITY DUE TO EXCESS CALORIES (HCC): ICD-10-CM

## 2020-04-29 DIAGNOSIS — E28.2 PCOS (POLYCYSTIC OVARIAN SYNDROME): ICD-10-CM

## 2020-04-29 DIAGNOSIS — I10 ESSENTIAL HYPERTENSION: ICD-10-CM

## 2020-04-29 DIAGNOSIS — G43.019 INTRACTABLE MIGRAINE WITHOUT AURA AND WITHOUT STATUS MIGRAINOSUS: ICD-10-CM

## 2020-04-29 PROCEDURE — 99213 OFFICE O/P EST LOW 20 MIN: CPT | Performed by: FAMILY MEDICINE

## 2020-04-29 RX ORDER — METFORMIN HYDROCHLORIDE 750 MG/1
750 TABLET, EXTENDED RELEASE ORAL
Qty: 90 TABLET | Refills: 0 | Status: SHIPPED | OUTPATIENT
Start: 2020-04-29 | End: 2020-08-03

## 2020-06-17 ENCOUNTER — TELEPHONE (OUTPATIENT)
Dept: SLEEP CENTER | Facility: CLINIC | Age: 30
End: 2020-06-17

## 2020-06-25 ENCOUNTER — OFFICE VISIT (OUTPATIENT)
Dept: SLEEP CENTER | Facility: CLINIC | Age: 30
End: 2020-06-25
Payer: COMMERCIAL

## 2020-06-25 VITALS
DIASTOLIC BLOOD PRESSURE: 89 MMHG | WEIGHT: 293 LBS | HEIGHT: 67 IN | SYSTOLIC BLOOD PRESSURE: 144 MMHG | HEART RATE: 86 BPM | BODY MASS INDEX: 45.99 KG/M2

## 2020-06-25 DIAGNOSIS — G47.33 OSA (OBSTRUCTIVE SLEEP APNEA): ICD-10-CM

## 2020-06-25 DIAGNOSIS — E66.01 MORBID (SEVERE) OBESITY DUE TO EXCESS CALORIES (HCC): ICD-10-CM

## 2020-06-25 DIAGNOSIS — E28.2 PCOS (POLYCYSTIC OVARIAN SYNDROME): ICD-10-CM

## 2020-06-25 DIAGNOSIS — F41.9 ANXIETY: ICD-10-CM

## 2020-06-25 DIAGNOSIS — G43.019 INTRACTABLE MIGRAINE WITHOUT AURA AND WITHOUT STATUS MIGRAINOSUS: ICD-10-CM

## 2020-06-25 DIAGNOSIS — J35.1 TONSILLAR HYPERTROPHY: ICD-10-CM

## 2020-06-25 DIAGNOSIS — R06.83 SNORING: ICD-10-CM

## 2020-06-25 DIAGNOSIS — I10 ESSENTIAL HYPERTENSION: ICD-10-CM

## 2020-06-25 DIAGNOSIS — R40.0 DAYTIME SLEEPINESS: Primary | ICD-10-CM

## 2020-06-25 DIAGNOSIS — G47.09 OTHER INSOMNIA: ICD-10-CM

## 2020-06-25 PROCEDURE — 99204 OFFICE O/P NEW MOD 45 MIN: CPT | Performed by: INTERNAL MEDICINE

## 2020-08-03 DIAGNOSIS — E11.9 TYPE 2 DIABETES MELLITUS WITHOUT COMPLICATION, WITHOUT LONG-TERM CURRENT USE OF INSULIN (HCC): ICD-10-CM

## 2020-08-03 RX ORDER — METFORMIN HYDROCHLORIDE 750 MG/1
TABLET, EXTENDED RELEASE ORAL
Qty: 90 TABLET | Refills: 0 | Status: SHIPPED | OUTPATIENT
Start: 2020-08-03 | End: 2022-03-02 | Stop reason: ALTCHOICE

## 2020-08-06 ENCOUNTER — HOSPITAL ENCOUNTER (OUTPATIENT)
Dept: SLEEP CENTER | Facility: CLINIC | Age: 30
Discharge: HOME | End: 2020-08-06
Payer: COMMERCIAL

## 2020-08-06 DIAGNOSIS — R06.83 SNORING: ICD-10-CM

## 2020-08-06 DIAGNOSIS — R40.0 DAYTIME SLEEPINESS: ICD-10-CM

## 2020-08-06 DIAGNOSIS — G43.019 INTRACTABLE MIGRAINE WITHOUT AURA AND WITHOUT STATUS MIGRAINOSUS: ICD-10-CM

## 2020-08-06 DIAGNOSIS — J35.1 TONSILLAR HYPERTROPHY: ICD-10-CM

## 2020-08-06 PROCEDURE — G0399 HOME SLEEP TEST/TYPE 3 PORTA: HCPCS

## 2020-08-06 NOTE — PROGRESS NOTES
Home Sleep Study Documentation    Pre-Sleep Home Study:    Set-up and instructions performed by: JAYCOB Degroot    Technician performed demonstration for Patient: yes    Return demonstration performed by Patient: yes    Written instructions provided to Patient: yes    Patient signed consent form: yes        Post-Sleep Home Study:    Additional comments by Patient: "I didn't sleep well at all  Was worried about the equipment   The nasal cannula came off when I rolled over, but I was able to put it back"    Home Sleep Study Failed:no:    Failure reason: N/A    Reported or Detected: N/A    Scored by: ROBIN Nj, JAYCOB

## 2020-08-13 ENCOUNTER — TELEPHONE (OUTPATIENT)
Dept: SLEEP CENTER | Facility: CLINIC | Age: 30
End: 2020-08-13

## 2020-08-20 ENCOUNTER — OFFICE VISIT (OUTPATIENT)
Dept: SLEEP CENTER | Facility: CLINIC | Age: 30
End: 2020-08-20
Payer: COMMERCIAL

## 2020-08-20 VITALS
HEART RATE: 78 BPM | BODY MASS INDEX: 45.99 KG/M2 | DIASTOLIC BLOOD PRESSURE: 81 MMHG | WEIGHT: 293 LBS | HEIGHT: 67 IN | SYSTOLIC BLOOD PRESSURE: 128 MMHG

## 2020-08-20 DIAGNOSIS — E66.01 MORBID (SEVERE) OBESITY DUE TO EXCESS CALORIES (HCC): ICD-10-CM

## 2020-08-20 DIAGNOSIS — E28.2 PCOS (POLYCYSTIC OVARIAN SYNDROME): ICD-10-CM

## 2020-08-20 DIAGNOSIS — G47.33 OSA (OBSTRUCTIVE SLEEP APNEA): Primary | ICD-10-CM

## 2020-08-20 DIAGNOSIS — J35.1 TONSILLAR HYPERTROPHY: ICD-10-CM

## 2020-08-20 DIAGNOSIS — G47.09 OTHER INSOMNIA: ICD-10-CM

## 2020-08-20 DIAGNOSIS — I10 ESSENTIAL HYPERTENSION: ICD-10-CM

## 2020-08-20 DIAGNOSIS — F41.9 ANXIETY: ICD-10-CM

## 2020-08-20 PROCEDURE — 99214 OFFICE O/P EST MOD 30 MIN: CPT | Performed by: INTERNAL MEDICINE

## 2020-08-20 PROCEDURE — 3074F SYST BP LT 130 MM HG: CPT | Performed by: INTERNAL MEDICINE

## 2020-08-20 PROCEDURE — 3008F BODY MASS INDEX DOCD: CPT | Performed by: INTERNAL MEDICINE

## 2020-08-20 PROCEDURE — 1036F TOBACCO NON-USER: CPT | Performed by: INTERNAL MEDICINE

## 2020-08-20 PROCEDURE — 3079F DIAST BP 80-89 MM HG: CPT | Performed by: INTERNAL MEDICINE

## 2020-08-20 NOTE — PROGRESS NOTES
Review of Systems      Genitourinary none   Cardiology none   Gastrointestinal frequent heartburn/acid reflux   Neurology frequent headaches, awaken with headache and balance problems   Constitutional claustrophobia   Integumentary none   Psychiatry anxiety and depression   Musculoskeletal back pain   Pulmonary snoring   ENT none   Endocrine none   Hematological none

## 2020-08-20 NOTE — PATIENT INSTRUCTIONS

## 2020-08-20 NOTE — PROGRESS NOTES
Follow-up Note - Sleep Center   Sandhya Golden  27 y o  female  CJU:[birth year]  XCJ:[id number]    I saw Rush Ocampo in sleep clinic today for her sleep complaints & comorbidities  Home sleep testing was undertaken to evaluate for sleep disordered breathing and patient is here to review results and further options  The study demonstrated : RADHA (respiratory event index of) 56 /hour  Minimum oxygen saturation 68 %  and 7 9% of total sleep time was spent with saturations less than 90%  The snore index was 94 9%  PFSH, Problem List, Medications & Allergies were reviewed in EMR  Interval changes: none reported  She  has a past medical history of Depression, Migraine, Obesity, and PCOS (polycystic ovarian syndrome)  She has a current medication list which includes the following prescription(s): meclizine, metformin, olmesartan, and pantoprazole  ROS: constitutional, psychiatric, ENT, respiratory,CVS, GI, UGS, CNS, MSK, integumentary, endocrine, hematological reviewed  Significant for - see attached  HPI:  She has ongoing symptoms as outlined in her initial report:  Most notably frequent migraines and disrupted sleep    Sleep Routine:  She estimates getting around 6 hours sleep  She has difficulty initiating sleep  She awakens feeling unrefreshed  She has occasional   excessive drowsiness but avoids napping  She rated herself at Total score: 3 /24 on the [place] sleepiness scale  Habits:  reports that she has never smoked  She has never used smokeless tobacco  She reports that she does not drink alcohol or use drugs  She uses moderate amount of caffeine  She exercises regularly  EXAM: /81   Pulse 78   Ht 5' 7" (1 702 m)   Wt (!) 149 kg (328 lb)   BMI 51 37 kg/m²     Patient is well groomed; well appearing  Skin/Extrem: warm & dry; col & hydration normal; no edema  Psych: cooperativeand in no distress   Mental state appears normal   CNS: Alert, orientated, clear & coherent speech  H&N: EOMI; NC/AT:no facial pressure marks, no rashes  ENMT Mucosa appearsnormal Nasal airway:patent  Oral airway:  crowded  Base of tongue is at Mallampati IV  There is 2 to 3+ tonsillar hypertrophy   Resp:effort is normal CVS: RRR ABD: truncal obesity MSK:Gait normal     IMPRESSION: Primary Sleep/Secondary(to Medical or Psych conditions) & comorbidities   1  ABRAN (obstructive sleep apnea)  CPAP Study   2  Other insomnia     3  Tonsillar hypertrophy     4  Essential hypertension     5  Anxiety     6  PCOS (polycystic ovarian syndrome)     7  Morbid (severe) obesity due to excess calories ([facility] [address] )       PLAN:    1  I reviewed results of the Sleep study with the patient  2  With respect to above conditions, I counseled on pathophysiology, diagnosis, treatment options, risks and benefits; inter-relationship and effects on symptoms and comorbidities; risks of no treatment; costs and insurance aspects  3  Patient elected positive airway pressure therapy and is to be scheduled for a titration study  4  Cognitive behavioral therapy was initiated, Sleep Hygiene and behavioral techniques to manage Insomnia were discussed  5  I also advised on weight reduction  6  Follow-up to be scheduled after the study to review results and to initiate therapy             Sincerely,    Authenticated electronically by Esha Hughes MD on 46/03/84   Board Certified Specialist

## 2020-08-21 ENCOUNTER — TELEPHONE (OUTPATIENT)
Dept: SLEEP CENTER | Facility: CLINIC | Age: 30
End: 2020-08-21

## 2020-08-21 DIAGNOSIS — Z20.822 ENCOUNTER FOR LABORATORY TESTING FOR COVID-19 VIRUS: Primary | ICD-10-CM

## 2020-11-11 ENCOUNTER — TELEPHONE (OUTPATIENT)
Dept: SLEEP CENTER | Facility: CLINIC | Age: 30
End: 2020-11-11

## 2021-03-10 DIAGNOSIS — Z23 ENCOUNTER FOR IMMUNIZATION: ICD-10-CM

## 2021-03-17 ENCOUNTER — IMMUNIZATIONS (OUTPATIENT)
Dept: FAMILY MEDICINE CLINIC | Facility: HOSPITAL | Age: 31
End: 2021-03-17

## 2021-03-17 DIAGNOSIS — Z23 ENCOUNTER FOR IMMUNIZATION: Primary | ICD-10-CM

## 2021-03-17 PROCEDURE — 91301 SARS-COV-2 / COVID-19 MRNA VACCINE (MODERNA) 100 MCG: CPT

## 2021-03-17 PROCEDURE — 0011A SARS-COV-2 / COVID-19 MRNA VACCINE (MODERNA) 100 MCG: CPT

## 2021-04-16 ENCOUNTER — IMMUNIZATIONS (OUTPATIENT)
Dept: FAMILY MEDICINE CLINIC | Facility: HOSPITAL | Age: 31
End: 2021-04-16

## 2021-04-16 DIAGNOSIS — Z23 ENCOUNTER FOR IMMUNIZATION: Primary | ICD-10-CM

## 2021-04-16 PROCEDURE — 91301 SARS-COV-2 / COVID-19 MRNA VACCINE (MODERNA) 100 MCG: CPT

## 2021-04-16 PROCEDURE — 0012A SARS-COV-2 / COVID-19 MRNA VACCINE (MODERNA) 100 MCG: CPT

## 2021-07-15 NOTE — ED PROVIDER NOTE - ATTENDING CONTRIBUTION TO CARE
20.5
Dr. Emery: I have personally seen and examined this patient at the bedside. I have fully participated in the care of this patient. I have reviewed all pertinent clinical information, including history, physical exam, plan and the Resident's note and agree except as noted. HPI above as by me. PE above as by me. Palpitations, ekg sinus nl axis neg st abnl PLAN check electrolytes and tsh  HA, neg sinus tenderness, cranial nerves intact PLAN reglan, toradol, fluids.

## 2021-10-25 NOTE — ED ADULT TRIAGE NOTE - NS ED NOTE AC HIGH RISK COUNTRIES
No
left knee with anterior and medial tenderness, FROM, no joint instability, valgus/varus intact/TENDERNESS

## 2021-10-27 ENCOUNTER — TELEPHONE (OUTPATIENT)
Dept: NEUROLOGY | Facility: CLINIC | Age: 31
End: 2021-10-27

## 2022-02-16 ENCOUNTER — TELEPHONE (OUTPATIENT)
Dept: NEUROLOGY | Facility: CLINIC | Age: 32
End: 2022-02-16

## 2022-02-16 NOTE — TELEPHONE ENCOUNTER
Called and left a voicemail for patient - Please call back to confirm upcoming appointment with Dr Nani Singh  Provided patient with apt date, time and location  Informed patient that check in is at least 15 minutes prior to apt time

## 2022-03-01 NOTE — PROGRESS NOTES
[facility] Consult  PATIENT:  Terrell Alves  MRN:  [id number]  :  1990  DATE OF SERVICE:  3/2/2022  REFERRED BY: Self, Referral  PMD: Stacie Grajeda MD    Assessment/Plan:     Terrell Alves is a delightful 32 y o  female with a past medical history that includes  migraine, chronic neck pain, cervical herniated disc, anxiety, depression, insomnia, BMI over 39, uncontrolled diabetes mellitus type 2, uncontrolled severe ABRAN, PCOS, hypertension, GERD, hyperlipidemia referred here for evaluation of headache  My initial evaluation 3/2/2022     Migraine with aura and without status migrainosus, not intractable  She reports a long history of headaches and migraines dating back to age 11 with a strong family history of migraines in both parents and sister  She has followed with neurology in the past and had multiple MRI Brains that have showed per her report only nonspecific white matter changes likely related to migraine  She reports pain varies in location, but often unilateral retro-orbital pain and when worse can be midoccipital and diffuse  She reports sometimes will have brief visual aura prior we discussed not using estrogen containing birth control due to stroke risk  - as of 3/1/2022: once a week headache, more of a migraine type every other week, better over the past couple months  Trial of rizatriptan for abortive and discussed thoroughly that treatment of other medical conditions may help with multiple symptoms  Workup:  - discussion is to follow-up with primary care provider regarding untreated other medical issues including diabetes, hypertension and with Sleep Medicine for untreated sleep apnea    Also needs routine lab evaluation - and she plans to do all of this now that she further understands her diagnosis  - Neurologic assessment reveals normal neurological exam except for diffuse diminished reflexes  - with gradually improving symptoms over the past few months, no new or concerning symptoms, no red flags and no concerning findings on neurologic exam, we discussed there would be no specific indication for further evaluation with repeat imaging at this time unless any of these were to occur  Certainly also would like her to try and obtain at least the last MRI brain images on CD for my review which were around 2017  Preventative:  - we discussed headache hygiene and lifestyle factors that may improve headaches  - she would like to hold off on medications at this time which is reasonable since she is likely about to start diabetes medications and possibly blood pressure medications as well  Did list headache preventative supplements she could drive she would like  - Past/ failed/contraindicated: none   - future options:  Topiramate could help with her weight loss journey, venlafaxine can help with mood, verapamil or propranolol could help with blood pressure, CGRP med     Abortive:  - discussed not taking over-the-counter or prescription pain medications more than 3 days per week to prevent medication overuse/rebound headache  -     rizatriptan (MAXALT) 10 MG tablet; Take 1 tablet (10 mg total) by mouth once as needed for migraine May repeat in 2 hours if needed  Max 2/24 hours, 9/month  Discussed proper use, possible side effects and risks  - Past/ failed/contraindicated:  OTC meds not effective  - future options: Alternative Triptan,  prochlorperazine, Toradol IM or p o , could consider trial for 5 days of Depakote or dexamethasone for prolonged migraine, ubrelvy, reyvow, nurtec    ABRAN (obstructive sleep apnea)  - home study 8/10/20 - [facility]  -     Ambulatory referral to Sleep Medicine; Future  - discussed severe morbidity and mortality associated with untreated sleep apnea and safety precautions including not driving if not feeling cognitively aware or sleepy    Cervicalgia  -    Chronic neck pain would not be my area of expertise    Certainly if does not improve with physical therapy would recommend referral to physiatry or pain management provider for further evaluation and management  - Ambulatory referral to Physical Therapy; Future    Patient instructions      Referral to sleep medicine for untreated sleep apnea - [phone]    Follow up with PCP or endocrine for untreated diabetes    Follow up with eye doctor for dilated eye exam     Due to migraine aura I do not recommend estrogen birth control     Try and obtain MRI Brain images on CD for my review     Our  will reach out to you with a list of counselors covered by your insurance      Headache/migraine treatment:   Abortive medications (for immediate treatment of a headache): It is ok to take ibuprofen, acetaminophen or naproxen (Advil, Tylenol,  Aleve, Excedrin) if they help your headaches you should limit these to No more than 3 times a week to avoid medication overuse/rebound headaches  For your more moderate to severe migraines take this medication early   Maxalt (rizatriptan) 10mg tabs - take one at the onset of headache  May repeat one time after 1-2 hours if pain has not resolved  (Max 2 a day and 9 a month)     - some people may have some side effects from this medication, most typically do not  Common side effects include making you feel tired, palpitations, tingling or tightness of the face or chest   Most people report the side effects are nothing compared to their migraines and do not mind these  If you have intolerable side effects we will stop  Over the counter preventive supplements for headaches/migraines (if you try, try for 3 months straight)  (to take every day to help prevent headaches - not to take at the time of headache):   There are combo pills online of these - none of which regulated by FDA and double check dosing - take appropriate dose only once a day- preventa migraine, migravent, mind ease, migrelief   [] Magnesium 400mg daily (If any diarrhea or upset stomach, decrease dose  as tolerated)  [] Riboflavin (Vitamin B2) 400mg daily - try online   (FYI B2 may make your urine bright/neon yellow)  AND/OR  [] Herbal medication: Petasites/Butterbur 150 mg daily - try online  (When choosing your Butterbur online or in the store, beware that there are some poor preps containing pyrrolizidine alkaloids (PAs) that can be harmful to the liver  Therefore, do not use butterbur products that are not labeled as PA-free )      Prescription preventive medications for headaches/migraines   (to take every day to help prevent headaches - not to take at the time of headache):  [x] we have options if you would like         *Typically these types of medications take time untill you see the benefit, although some may see improvement in days, often it may take weeks, especially if the medication is being titrated up to a beneficial level  Please contact us if there are any concerns or questions regarding the medication  Lifestyle Recommendations:  [x] SLEEP - Maintain a regular sleep schedule: Adults need at least 7-8 hours of uninterrupted a night  Maintain good sleep hygiene:  Going to bed and waking up at consistent times, avoiding excessive daytime naps, avoiding caffeinated beverages in the evening, avoid excessive stimulation in the evening and generally using bed primarily for sleeping  One hour before bedtime would recommend turning lights down lower, decreasing your activity (may read quietly, listen to music at a low volume)  When you get into bed, should eliminate all technology (no texting, emailing, playing with your phone, iPad or tablet in bed)  [x] HYDRATION - Maintain good hydration  Drink  2L of fluid a day (4 typical small water bottles)  [x] DIET - Maintain good nutrition  In particular don't skip meals and try and eat healthy balanced meals regularly  [x] TRIGGERS - Look for other triggers and avoid them: Limit caffeine to 1-2 cups a day or less   Avoid dietary triggers that you have noticed bring on your headaches (this could include aged cheese, peanuts, MSG, aspartame and nitrates)  [x] EXERCISE - physical exercise as we all know is good for you in many ways, and not only is good for your heart, but also is beneficial for your mental health, cognitive health and  chronic pain/headaches  I would encourage at the least 5 days of physical exercise weekly for at least 30 minutes  Education and Follow-up  [x] Please call with any questions or concerns  Of course if any new concerning symptoms go to the emergency department  [x] Follow up 3-4 months, sooner if needed        CC: We had the pleasure of evaluating Marita Galeazzi in neurological consultation today  Marita Galeazzi is a   right handed female who presents today for evaluation of headaches  History obtained from patient as well as available medical record review  History of Present Illness:   Current medical illnesses  or past medical history include migraine, chronic neck pain, herniated disc, depression, BMI over 45, uncontrolled diabetes mellitus, ABRAN, PCOS, hypertension, GERD, hyperlipidemia       Headaches started at what age? 11years old  How often do the headaches occur?   - as of 3/2/2022: once a week headache or migraine, worse every other time about twice a month, better over the past couple months   What time of the day do the headaches start? Sometimes wakes up with them, or earlier in the am   How long do the headaches last? Can last all day  Are you ever headache free? Yes    Aura?  Not always   With aura - visual floaters for seconds and then pain may starts  - can sometimes see patterns at night  - one time right eye was blurred      Last eye exam: 4 years ago   - no visual loss outside of headache      Where is your headache located and pain quality?   - varies, can be left retro-orbital - sharp, achy  - mid occipital and diffuse - the worse  - pulsating, pressure     What is the intensity of pain? Average: 5/10, worst 9-10/10  Associated symptoms:   [x] Nausea       [] Vomiting      [x] Stiff or sore neck   [x] Photophobia     [x]Phonophobia      [] Osmophobia   [] Blurred vision   [x] Light-headed or dizzy - saw ENT and had caloric testing that was negative for vertigo     [] Tinnitus - not with migraines  [] Ptosis      [] Facial droop   [] Lacrimation - unrelated to headache, both  [] Nasal congestion/rhinorrhea  - not with headache      Things that make the headache worse? No specific movements, any movement     Headache triggers:  Tension, stress, weather changes     Have you seen someone else for headaches or pain? Yes since childhood  Have you had trigger point injection performed and how often? No  Have you had Botox injection performed and how often? No   Have you had epidural injections or transforaminal injections performed? No  Are you current pregnant or planning on getting pregnant? Not for years, maybe ever, not on BC, not sexually active   Have you ever had any Brain imaging? Yes - MRI Brain - a couple, last was around 2017 - normal     What medications do you take or have you taken for your headaches?    ABORTIVE:            past  OTC medications have been ineffective       PREVENTIVE:   -    Past/ failed/contraindicated:  -       LIFESTYLE  Sleep - [facility] 8/10/20  - averages: 12/130 and wakes at 730  Problems falling asleep?:   Yes  Problems staying asleep?:  Yes    Water: not a lot - per day  Caffeine: 0 per day    Mood: anxiety, depression, not well controlled, no SI  Therapist in the past     The following portions of the patient's history were reviewed and updated as appropriate: allergies, current medications, past family history, past medical history, past social history, past surgical history and problem list     Pertinent family history:  Family history of headaches:  migraine headaches in mother and father, sister  Any family history of aneurysms - No  Maternal grandmother brain tumor of some sort, past in 2012    Pertinent social history:  Work: from home, supervisor for [employer], photography  Education: some college   Lives with parents, brother, sister, sisters       Past Medical History:     Past Medical History:   Diagnosis Date    Depression     Migraine     Obesity     PCOS (polycystic ovarian syndrome)        Patient Active Problem List   Diagnosis    Morbid (severe) obesity due to excess calories ([facility] [address] )    Migraine without aura and without status migrainosus, not intractable    PCOS (polycystic ovarian syndrome)    ABRAN (obstructive sleep apnea)       Medications:      Current Outpatient Medications   Medication Sig Dispense Refill    nystatin-triamcinolone (MYCOLOG-II) ointment Apply topically 2 (two) times a day as needed      atorvastatin (LIPITOR) 20 mg tablet Take 20 mg by mouth daily (Patient not taking: Reported on 3/2/2022 )      pantoprazole (PROTONIX) 20 mg tablet Take 1 tablet (20 mg total) by mouth daily before breakfast 30 tablet 5    rizatriptan (MAXALT) 10 MG tablet Take 1 tablet (10 mg total) by mouth once as needed for migraine May repeat in 2 hours if needed  Max 2/24 hours, 9/month  9 tablet 6     No current facility-administered medications for this visit          Allergies:    No Known Allergies    Family History:     Family History   Problem Relation Age of Onset    Diabetes Mother     Heart attack Mother     Heart disease Mother     Heart attack Father     Heart disease Father     Diabetes Father        Social History:       Social History     Socioeconomic History    Marital status: Single     Spouse name: Not on file    Number of children: Not on file    Years of education: Not on file    Highest education level: Not on file   Occupational History    Not on file   Tobacco Use    Smoking status: Never Smoker    Smokeless tobacco: Never Used   Vaping Use    Vaping Use: Never used   Substance and Sexual Activity    Alcohol use: Never    Drug use: Never    Sexual activity: Not Currently   Other Topics Concern    Not on file   Social History Narrative    Not on file     Social Determinants of Health     Financial Resource Strain: Not on file   Food Insecurity: Not on file   Transportation Needs: Not on file   Physical Activity: Not on file   Stress: Not on file   Social Connections: Not on file   Intimate Partner Violence: Not on file   Housing Stability: Not on file         Objective:       Physical Exam:                                                                 Vitals:            Constitutional:    /98 (BP Location: Left arm, Patient Position: Sitting, Cuff Size: Large)   Pulse 103   Ht 5' 8" (1 727 m)   Wt (!) 137 kg (302 lb)   BMI 45 92 kg/m²   BP Readings from Last 3 Encounters:   03/02/22 154/98   08/20/20 128/81   06/25/20 144/89     Pulse Readings from Last 3 Encounters:   03/02/22 103   08/20/20 78   06/25/20 86         Well developed, well nourished, well groomed  No dysmorphic features  HEENT:  Normocephalic atraumatic  Oropharynx is clear and moist  No oral mucosal lesions  Chest:  Respirations regular and unlabored  Cardiovascular:  Distal extremities warm without palpable edema or tenderness, no observed significant swelling  Musculoskeletal:  (see below under neurologic exam for evaluation of motor function and gait)   Skin:  warm and dry, not diaphoretic  No apparent birthmarks or stigmata of neurocutaneous disease  Psychiatric:  Normal behavior and appropriate affect        Neurological Examination:     Mental status/cognitive function:    Recent and remote memory intact  Attention span and concentration as well as fund of knowledge are appropriate for age  Normal language and spontaneous speech  Cranial Nerves:  II-visual fields full  Fundi poorly visualized due to pupillary constriction  III, IV, VI-Pupils were equal, round, and reactive to light and accomodation  Extraocular movements were full and conjugate without nystagmus  V-facial sensation symmetric  VII-facial expression symmetric, intact forehead wrinkle, strong eye closure, symmetric smile    VIII-hearing grossly intact bilaterally   IX, X-palate elevation symmetric, no dysarthria  XI-shoulder shrug strength intact    XII-tongue protrusion midline  Motor Exam: symmetric bulk and tone throughout, no pronator drift  Power/strength 5/5 bilateral upper and lower extremities, no atrophy, fasciculations or abnormal movements noted  Sensory: grossly intact light touch in all extremities  Reflexes: brachioradialis 0+, biceps 0+, knee 1+, ankle 0 bilaterally  No ankle clonus  Coordination: Finger nose finger intact bilaterally, no apparent dysmetria, ataxia or tremor noted  Gait: steady casual and tandem gait  Pertinent lab results:   12/16/2021 A1c 10 2  -04/25/2020 CMP with glucose 163  CBC with WBC 11 8  TSH normal    EKG 02/26/2020 normal sinus rhythm, rate 82,      Imaging:   No head Imaging noted in system     - MRI Brain - a couple, last was around 2017 - normal except for non specific white matter changes        Review of Systems:   ROS obtained by medical assistant Personally reviewed and updated if indicated  I recommended PCP follow up for non neurologic problems  Review of Systems   Constitutional: Negative  Negative for appetite change and fever  HENT: Negative  Negative for hearing loss, tinnitus, trouble swallowing and voice change  Eyes:  Negative for photophobia and pain  Respiratory: Negative  Negative for shortness of breath  Cardiovascular: Negative  Negative for palpitations  Gastrointestinal: Negative  Negative for nausea and vomiting  Endocrine: Negative  Negative for cold intolerance  Genitourinary: Negative  Negative for dysuria, frequency and urgency  Musculoskeletal: Positive for Chronic neck pain  Negative for myalgias  Skin: Negative  Negative for rash  Neurological: Positive for headaches   Negative for tremors, seizures, syncope, facial asymmetry, speech difficulty, weakness, light-headedness and numbness  Hematological: Negative  Does not bruise/bleed easily  Psychiatric/Behavioral: Negative  Negative for confusion, hallucinations and sleep disturbance  I have spent 72 minutes with Patient today in which greater than 50% of this time was spent in counseling/coordination of care regarding Diagnostic results, Prognosis, Risks and benefits of tx options, Intructions for management, Patient and family education, Importance of tx compliance, Risk factor reductions and Impressions  I also spent 16 minutes non face to face for this patient the same day           Author:  Melissa Tran MD 3/2/2022 5:03 PM

## 2022-03-02 ENCOUNTER — OFFICE VISIT (OUTPATIENT)
Dept: NEUROLOGY | Facility: CLINIC | Age: 32
End: 2022-03-02
Payer: COMMERCIAL

## 2022-03-02 ENCOUNTER — TELEPHONE (OUTPATIENT)
Dept: NEUROLOGY | Facility: CLINIC | Age: 32
End: 2022-03-02

## 2022-03-02 VITALS
WEIGHT: 293 LBS | DIASTOLIC BLOOD PRESSURE: 98 MMHG | HEIGHT: 68 IN | SYSTOLIC BLOOD PRESSURE: 154 MMHG | HEART RATE: 103 BPM | BODY MASS INDEX: 44.41 KG/M2

## 2022-03-02 DIAGNOSIS — M54.2 CERVICALGIA: ICD-10-CM

## 2022-03-02 DIAGNOSIS — G47.33 OSA (OBSTRUCTIVE SLEEP APNEA): ICD-10-CM

## 2022-03-02 DIAGNOSIS — G43.109 MIGRAINE WITH AURA AND WITHOUT STATUS MIGRAINOSUS, NOT INTRACTABLE: Primary | ICD-10-CM

## 2022-03-02 PROBLEM — G43.009 MIGRAINE WITHOUT AURA AND WITHOUT STATUS MIGRAINOSUS, NOT INTRACTABLE: Status: ACTIVE | Noted: 2020-04-24

## 2022-03-02 PROCEDURE — 3008F BODY MASS INDEX DOCD: CPT | Performed by: PSYCHIATRY & NEUROLOGY

## 2022-03-02 PROCEDURE — 1036F TOBACCO NON-USER: CPT | Performed by: PSYCHIATRY & NEUROLOGY

## 2022-03-02 PROCEDURE — 99205 OFFICE O/P NEW HI 60 MIN: CPT | Performed by: PSYCHIATRY & NEUROLOGY

## 2022-03-02 RX ORDER — RIZATRIPTAN BENZOATE 10 MG/1
10 TABLET ORAL ONCE AS NEEDED
Qty: 9 TABLET | Refills: 6 | Status: SHIPPED | OUTPATIENT
Start: 2022-03-02

## 2022-03-02 RX ORDER — ATORVASTATIN CALCIUM 20 MG/1
20 TABLET, FILM COATED ORAL DAILY
COMMUNITY
Start: 2021-12-22 | End: 2022-12-22

## 2022-03-02 RX ORDER — NYSTATIN AND TRIAMCINOLONE ACETONIDE 100000; 1 [USP'U]/G; MG/G
OINTMENT TOPICAL 2 TIMES DAILY PRN
COMMUNITY
Start: 2022-02-14 | End: 2023-02-14

## 2022-03-02 NOTE — TELEPHONE ENCOUNTER
----- Message from Flor Chang MD sent at 3/2/2022  4:37 PM EST -----  Could you please help this patient with a list of therapists or psychologists covered by their insurance?

## 2022-03-02 NOTE — LETTER
Eloisa Ellison  [address] [address]      Our office has been unsuccessful in trying to reach you by phone regarding:    ? Other:_____Counseling_      Please see list below of in network counselors with your insurance  When selecting the provider of your choice, please confirm that they continue to be in network with your insurance       Iman Mejia, PHD  Psychologist  [address]  [phone] SOGLORIA Mendiola, PHD  Psychologist  [address] [address]  [phone] EVELYN Olsen, PHD  Psychologist  [address]  [phone] [facility] [place]     Avni Yadav EDD  Psychologist  [address]  [phone] [facility]     Kenneth Sood, [facility]  Psychologist  [address] [place] [facility] [address] [facility]  [phone] LULI Daniel [facility]  Psychologist  [address] [facility] [address] [address] [facility]  [phone] [facility]     Maine Godwin, PHD  Psychologist  [facility] [address] [facility] [address]  [place] [id number]  [phone] LAN        Sincerely,          Jorge Luis Dwyer     [phone]  [facility]

## 2022-03-02 NOTE — PATIENT INSTRUCTIONS
Referral to sleep medicine for untreated sleep apnea - [phone]    Follow up with PCP or endocrine for untreated diabetes    Follow up with eye doctor for dilated eye exam     Due to migraine aura I do not recommend estrogen birth control     Try and obtain MRI Brain images on CD for my review     Our  will reach out to you with a list of counselors covered by your insurance      Headache/migraine treatment:   Abortive medications (for immediate treatment of a headache): It is ok to take ibuprofen, acetaminophen or naproxen (Advil, Tylenol,  Aleve, Excedrin) if they help your headaches you should limit these to No more than 3 times a week to avoid medication overuse/rebound headaches  For your more moderate to severe migraines take this medication early   Maxalt (rizatriptan) 10mg tabs - take one at the onset of headache  May repeat one time after 1-2 hours if pain has not resolved  (Max 2 a day and 9 a month)     - some people may have some side effects from this medication, most typically do not  Common side effects include making you feel tired, palpitations, tingling or tightness of the face or chest   Most people report the side effects are nothing compared to their migraines and do not mind these  If you have intolerable side effects we will stop  Over the counter preventive supplements for headaches/migraines (if you try, try for 3 months straight)  (to take every day to help prevent headaches - not to take at the time of headache):   There are combo pills online of these - none of which regulated by FDA and double check dosing - take appropriate dose only once a day- preventa migraine, migravent, mind ease, migrelief   [] Magnesium 400mg daily (If any diarrhea or upset stomach, decrease dose  as tolerated)  [] Riboflavin (Vitamin B2) 400mg daily - try online   (FYI B2 may make your urine bright/neon yellow)  AND/OR  [] Herbal medication: Petasites/Butterbur 150 mg daily - try online  (When choosing your Butterbur online or in the store, beware that there are some poor preps containing pyrrolizidine alkaloids (PAs) that can be harmful to the liver  Therefore, do not use butterbur products that are not labeled as PA-free )      Prescription preventive medications for headaches/migraines   (to take every day to help prevent headaches - not to take at the time of headache):  [x] we have options if you would like         *Typically these types of medications take time untill you see the benefit, although some may see improvement in days, often it may take weeks, especially if the medication is being titrated up to a beneficial level  Please contact us if there are any concerns or questions regarding the medication  Lifestyle Recommendations:  [x] SLEEP - Maintain a regular sleep schedule: Adults need at least 7-8 hours of uninterrupted a night  Maintain good sleep hygiene:  Going to bed and waking up at consistent times, avoiding excessive daytime naps, avoiding caffeinated beverages in the evening, avoid excessive stimulation in the evening and generally using bed primarily for sleeping  One hour before bedtime would recommend turning lights down lower, decreasing your activity (may read quietly, listen to music at a low volume)  When you get into bed, should eliminate all technology (no texting, emailing, playing with your phone, iPad or tablet in bed)  [x] HYDRATION - Maintain good hydration  Drink  2L of fluid a day (4 typical small water bottles)  [x] DIET - Maintain good nutrition  In particular don't skip meals and try and eat healthy balanced meals regularly  [x] TRIGGERS - Look for other triggers and avoid them: Limit caffeine to 1-2 cups a day or less  Avoid dietary triggers that you have noticed bring on your headaches (this could include aged cheese, peanuts, MSG, aspartame and nitrates)    [x] EXERCISE - physical exercise as we all know is good for you in many ways, and not only is good for your heart, but also is beneficial for your mental health, cognitive health and  chronic pain/headaches  I would encourage at the least 5 days of physical exercise weekly for at least 30 minutes  Education and Follow-up  [x] Please call with any questions or concerns  Of course if any new concerning symptoms go to the emergency department    [x] Follow up 3-4 months, sooner if needed

## 2022-03-02 NOTE — PROGRESS NOTES
Review of Systems   Constitutional: Negative  Negative for appetite change and fever  HENT: Negative  Negative for hearing loss, tinnitus, trouble swallowing and voice change  Eyes: Positive for visual disturbance  Negative for photophobia and pain  Respiratory: Negative  Negative for shortness of breath  Cardiovascular: Negative  Negative for palpitations  Gastrointestinal: Negative  Negative for nausea and vomiting  Endocrine: Negative  Negative for cold intolerance  Genitourinary: Negative  Negative for dysuria, frequency and urgency  Musculoskeletal: Positive for neck pain  Negative for myalgias  Skin: Negative  Negative for rash  Neurological: Positive for dizziness and headaches (1-2 per week)  Negative for tremors, seizures, syncope, facial asymmetry, speech difficulty, weakness, light-headedness and numbness  Hematological: Negative  Does not bruise/bleed easily  Psychiatric/Behavioral: Negative  Negative for confusion, hallucinations and sleep disturbance

## 2022-03-04 NOTE — TELEPHONE ENCOUNTER
MSW phoned patient and lvm to provide list of in network providers please call back to learn if MSW can send it via [employer] of via mail

## 2022-03-04 NOTE — TELEPHONE ENCOUNTER
Claudia Palma, PHD  Psychologist  [address] [place]  [phone] TMDOMENICO Dewitt, PHD  Psychologist  [address]  [phone] KVDUX    Hemant Delgado, PHD  Psychologist  [address] [facility]  [phone], 118 [facility], JESSICA  Psychologist  [address]  [phone] NGX    Adela Shay, THE [facility]  Psychologist  [address] [facility] [address] Maris Khoury [address] [facility]  [phone] VIR    Marcia Sims, [place]  Psychologist  [address]  [facility] [address] [facility] [address] [facility]  [phone] GFMSX    Hetal Alonzo, PHD  Psychologist  [facility] [address]   [phone] AVUFQ

## 2022-03-07 NOTE — TELEPHONE ENCOUNTER
MSW phoned patient and lvm  to learn if MSW can send list of in network counselors  Please call back

## 2022-06-28 ENCOUNTER — TELEPHONE (OUTPATIENT)
Dept: NEUROLOGY | Facility: CLINIC | Age: 32
End: 2022-06-28

## 2022-06-28 NOTE — TELEPHONE ENCOUNTER
Called and left message for patient confirming their upcoming appointment with Dr Simón Badillo on 7/6/22

## 2022-11-03 ENCOUNTER — TELEPHONE (OUTPATIENT)
Dept: ADMINISTRATIVE | Facility: OTHER | Age: 32
End: 2022-11-03

## 2022-11-03 ENCOUNTER — OFFICE VISIT (OUTPATIENT)
Dept: FAMILY MEDICINE CLINIC | Facility: CLINIC | Age: 32
End: 2022-11-03

## 2022-11-03 VITALS
TEMPERATURE: 98.2 F | RESPIRATION RATE: 16 BRPM | HEART RATE: 102 BPM | HEIGHT: 68 IN | OXYGEN SATURATION: 96 % | BODY MASS INDEX: 44.41 KG/M2 | SYSTOLIC BLOOD PRESSURE: 140 MMHG | DIASTOLIC BLOOD PRESSURE: 90 MMHG | WEIGHT: 293 LBS

## 2022-11-03 DIAGNOSIS — M79.604 BILATERAL LEG PAIN: Primary | ICD-10-CM

## 2022-11-03 DIAGNOSIS — E11.65 TYPE 2 DIABETES MELLITUS WITH HYPERGLYCEMIA, WITHOUT LONG-TERM CURRENT USE OF INSULIN (HCC): ICD-10-CM

## 2022-11-03 DIAGNOSIS — Z11.4 SCREENING FOR HIV (HUMAN IMMUNODEFICIENCY VIRUS): ICD-10-CM

## 2022-11-03 DIAGNOSIS — R59.9 SWOLLEN GLAND: ICD-10-CM

## 2022-11-03 DIAGNOSIS — M79.605 BILATERAL LEG PAIN: Primary | ICD-10-CM

## 2022-11-03 DIAGNOSIS — Z11.59 NEED FOR HEPATITIS C SCREENING TEST: ICD-10-CM

## 2022-11-03 NOTE — PROGRESS NOTES
Name: Chhaya Cazares      : 1990      MRN: [id number]  Encounter Provider: Khloe Waterman MD  Encounter Date: 11/3/2022   Encounter department: [place] [address]  Bilateral leg pain  Comments:  normal exam  ? due to diabetes  to get labs  recheck next week     2  Need for hepatitis C screening test  -     Hepatitis C Antibody ([facility]); Future    3  Screening for HIV (human immunodeficiency virus)  -     HIV 1/2 Antigen/Antibody (4th Generation) w Reflex [facility]; Future    4  Type 2 diabetes mellitus with hyperglycemia, without long-term current use of insulin (HCC)  Comments:  Hba1c in  with her cardiologist was 10 0 and no one called or told her about this   will recheck labs and return next week   Orders:  -     Hemoglobin A1C  -     Comprehensive metabolic panel  -     TSH, 3rd generation with Free T4 reflex; Future  -     Lipid Panel with Direct LDL reflex; Future  -     Microalbumin / creatinine urine ratio    5  Swollen gland  -     US head neck soft tissue; Future; Expected date: 2022         Subjective    Leg Pain (Patient is here today with pain in both legs x2 months)  Foot Pain (Patient has been having feet pain for the past 2 months )  hasnt seen here for over 2 years  Started with rash over right leg 6 months ago without any injury or trauma  Bilateral leg and foot pain started 2 months ago   Seen by cardiologist dr Richie Christianson at [facility] last year and echo and stress test and heart monitor was all normal  hba1c in 2022 was > 10 and no one called her about this from cardiologist office and they never contacted me either        Leg Pain   There was no injury mechanism  The pain is present in the left leg and right leg  The quality of the pain is described as aching  The pain has been intermittent since onset  Associated symptoms include numbness and tingling   Pertinent negatives include no inability to bear weight, loss of motion, loss of sensation or muscle weakness  The treatment provided mild relief  Review of Systems   Constitutional: Negative  HENT: Negative  Respiratory: Negative  Cardiovascular: Negative  Skin: Negative  Neurological: Positive for tingling and numbness  Hematological: Negative  Current Outpatient Medications on File Prior to Visit   Medication Sig   • rizatriptan (MAXALT) 10 MG tablet Take 1 tablet (10 mg total) by mouth once as needed for migraine May repeat in 2 hours if needed  Max 2/24 hours, 9/month  • atorvastatin (LIPITOR) 20 mg tablet Take 20 mg by mouth daily (Patient not taking: No sig reported)   • nystatin-triamcinolone (MYCOLOG-II) ointment Apply topically 2 (two) times a day as needed (Patient not taking: Reported on 11/3/2022)       Objective     /90 (BP Location: Left arm, Patient Position: Sitting, Cuff Size: Large)   Pulse 102   Temp 98 2 °F (36 8 °C) (Tympanic)   Resp 16   Ht 5' 8" (1 727 m)   Wt 133 kg (293 lb 3 2 oz)   SpO2 96%   BMI 44 58 kg/m²     Physical Exam  Constitutional:       Appearance: Normal appearance  HENT:      Right Ear: Tympanic membrane normal       Left Ear: Tympanic membrane normal       Mouth/Throat:      Mouth: Mucous membranes are moist    Cardiovascular:      Rate and Rhythm: Normal rate and regular rhythm  Pulses: Normal pulses  no weak pulses          Dorsalis pedis pulses are 2+ on the right side and 2+ on the left side  Posterior tibial pulses are 2+ on the right side and 2+ on the left side  Heart sounds: Normal heart sounds  Pulmonary:      Effort: Pulmonary effort is normal       Breath sounds: Normal breath sounds  Musculoskeletal:         General: No swelling or tenderness  Normal range of motion  Feet:      Right foot:      Skin integrity: No ulcer, skin breakdown, erythema, warmth, callus or dry skin  Left foot:      Skin integrity: No ulcer, skin breakdown, erythema, warmth, callus or dry skin  Lymphadenopathy:      Cervical: Cervical adenopathy present  Neurological:      General: No focal deficit present  Mental Status: She is alert and oriented to person, place, and time  Psychiatric:         Mood and Affect: Mood normal          Behavior: Behavior normal      Patient's shoes and socks removed  Right Foot/Ankle   Right Foot Inspection  Skin Exam: skin normal and skin intact  No dry skin, no warmth, no callus, no erythema, no maceration, no abnormal color, no pre-ulcer, no ulcer and no callus  Toe Exam: ROM and strength within normal limits  Sensory   Vibration: intact  Proprioception: intact  Monofilament testing: diminished    Vascular  Capillary refills: < 3 seconds  The right DP pulse is 2+  The right PT pulse is 2+  Left Foot/Ankle  Left Foot Inspection  Skin Exam: skin normal and skin intact  No dry skin, no warmth, no erythema, no maceration, normal color, no pre-ulcer, no ulcer and no callus  Toe Exam: ROM and strength within normal limits  Sensory   Vibration: intact  Proprioception: intact  Monofilament testing: diminished    Vascular  Capillary refills: < 3 seconds  The left DP pulse is 2+  The left PT pulse is 2+       Assign Risk Category  No deformity present  Loss of protective sensation  No weak pulses  Risk: 1      Kosta King MD

## 2022-11-03 NOTE — TELEPHONE ENCOUNTER
Upon review of the In Basket request we were able to locate, review, and update the patient chart as requested for Pap Smear (HPV) aka Cervical Cancer Screening  Any additional questions or concerns should be emailed to the Practice Liaisons via the appropriate education email address, please do not reply via In Basket      Thank you  Viri Aragon

## 2022-11-03 NOTE — TELEPHONE ENCOUNTER
----- Message from Leanna Vu sent at 11/2/2022  4:31 PM EDT -----  Regarding: care gap request  11/02/22 4:31 PM    Hello, our patient attached above has had Pap Smear (HPV) aka Cervical Cancer Screening completed/performed  Please assist in updating the patient chart by pulling the Care Everywhere (CE) document  The date of service is 2/14/2022       Thank you,  Leanna Vu  [facility] [place]

## 2022-11-04 LAB
CREAT ?TM UR-SCNC: 152 UMOL/L
EXT MICROALBUMIN URINE RANDOM: 60.7
EXTERNAL HIV SCREEN: NORMAL
HBA1C MFR BLD HPLC: 10.6 %
HCV AB SER-ACNC: NEGATIVE
MICROALBUMIN/CREAT UR: 399.3 MG/G{CREAT}

## 2022-11-08 ENCOUNTER — HOSPITAL ENCOUNTER (OUTPATIENT)
Dept: ULTRASOUND IMAGING | Facility: HOSPITAL | Age: 32
Discharge: HOME | End: 2022-11-08

## 2022-11-08 DIAGNOSIS — R59.9 SWOLLEN GLAND: ICD-10-CM

## 2022-11-10 ENCOUNTER — OFFICE VISIT (OUTPATIENT)
Dept: FAMILY MEDICINE CLINIC | Facility: CLINIC | Age: 32
End: 2022-11-10

## 2022-11-10 VITALS
WEIGHT: 291.2 LBS | SYSTOLIC BLOOD PRESSURE: 116 MMHG | HEART RATE: 92 BPM | DIASTOLIC BLOOD PRESSURE: 78 MMHG | RESPIRATION RATE: 16 BRPM | HEIGHT: 68 IN | OXYGEN SATURATION: 98 % | BODY MASS INDEX: 44.13 KG/M2 | TEMPERATURE: 98.1 F

## 2022-11-10 DIAGNOSIS — E66.01 MORBID (SEVERE) OBESITY DUE TO EXCESS CALORIES (HCC): ICD-10-CM

## 2022-11-10 DIAGNOSIS — G43.009 MIGRAINE WITHOUT AURA AND WITHOUT STATUS MIGRAINOSUS, NOT INTRACTABLE: ICD-10-CM

## 2022-11-10 DIAGNOSIS — G47.33 OSA (OBSTRUCTIVE SLEEP APNEA): ICD-10-CM

## 2022-11-10 DIAGNOSIS — Z00.00 ANNUAL PHYSICAL EXAM: Primary | ICD-10-CM

## 2022-11-10 DIAGNOSIS — E11.65 TYPE 2 DIABETES MELLITUS WITH HYPERGLYCEMIA, WITHOUT LONG-TERM CURRENT USE OF INSULIN (HCC): ICD-10-CM

## 2022-11-10 RX ORDER — BLOOD-GLUCOSE METER
KIT MISCELLANEOUS
Qty: 1 KIT | Refills: 0 | Status: SHIPPED | OUTPATIENT
Start: 2022-11-10

## 2022-11-10 RX ORDER — METFORMIN HYDROCHLORIDE 750 MG/1
750 TABLET, EXTENDED RELEASE ORAL
Qty: 90 TABLET | Refills: 0 | Status: SHIPPED | OUTPATIENT
Start: 2022-11-10

## 2022-11-10 RX ORDER — BLOOD SUGAR DIAGNOSTIC
STRIP MISCELLANEOUS
Qty: 100 EACH | Refills: 3 | Status: SHIPPED | OUTPATIENT
Start: 2022-11-10

## 2022-11-10 RX ORDER — LANCETS 33 GAUGE
EACH MISCELLANEOUS
Qty: 100 EACH | Refills: 3 | Status: SHIPPED | OUTPATIENT
Start: 2022-11-10

## 2022-11-10 RX ORDER — SEMAGLUTIDE 1.34 MG/ML
INJECTION, SOLUTION SUBCUTANEOUS
Qty: 3 ML | Refills: 0 | Status: SHIPPED | OUTPATIENT
Start: 2022-11-10

## 2022-11-10 NOTE — ASSESSMENT & PLAN NOTE
Lab Results   Component Value Date    HGBA1C 10 6 (H) 11/04/2022   newly diagnosed   Trial of Metformin and Ozempic   Recheck in 3 months

## 2022-11-10 NOTE — PATIENT INSTRUCTIONS

## 2022-11-10 NOTE — PROGRESS NOTES
[address] [facility] PRACT[facility]    NAME: Chance Obregon  AGE: 28 y o  SEX: female  : 1990     DATE: 11/10/2022     Assessment and Plan:     Problem List Items Addressed This Visit        Endocrine    Type 2 diabetes mellitus with hyperglycemia, without long-term current use of insulin ([facility] [address] )       Lab Results   Component Value Date    HGBA1C 10 6 (H) 2022   newly diagnosed   Trial of Metformin and Ozempic   Recheck in 3 months          Relevant Medications    Semaglutide,0 25 or 0 5MG/DOS, (Ozempic, 0 25 or 0 5 MG/DOSE,) 2 MG/1 5ML SOPN    metFORMIN (GLUCOPHAGE-XR) 750 mg 24 hr tablet    Blood Glucose Monitoring Suppl (OneTouch Verio Reflect) w/Device KIT    glucose blood (OneTouch Verio) test strip    OneTouch Delica Lancets 53Z MISC    Other Relevant Orders    IRIS Diabetic eye exam    Hemoglobin U7Q    Basic metabolic panel       Respiratory    ABRAN (obstructive sleep apnea)     Weight loss will help             Cardiovascular and Mediastinum    Migraine without aura and without status migrainosus, not intractable     Recommended magnesium and B2             Other    Morbid (severe) obesity due to excess calories (HCC)     Diet and exercise discussed  Ozempic will help with insulin resistance as well            Other Visit Diagnoses     Annual physical exam    -  Primary          Immunizations and preventive care screenings were discussed with patient today  Appropriate education was printed on patient's after visit summary  Counseling:  Alcohol/drug use: discussed moderation in alcohol intake, the recommendations for healthy alcohol use, and avoidance of illicit drug use  Dental Health: discussed importance of regular tooth brushing, flossing, and dental visits  Injury prevention: discussed safety/seat belts, safety helmets, smoke detectors, carbon dioxide detectors, and smoking near bedding or upholstery    Sexual health: discussed sexually transmitted diseases, partner selection, use of condoms, avoidance of unintended pregnancy, and contraceptive alternatives  Exercise: the importance of regular exercise/physical activity was discussed  Recommend exercise 3-5 times per week for at least 30 minutes  BMI Counseling: Body mass index is 44 44 kg/m²  The BMI is above normal  Nutrition recommendations include decreasing portion sizes and encouraging healthy choices of fruits and vegetables  Exercise recommendations include moderate physical activity 150 minutes/week  No pharmacotherapy was ordered  Rationale for BMI follow-up plan is due to patient being overweight or obese  Return in 3 months (on 2/10/2023) for Diabetes Follow-up  Chief Complaint:     Chief Complaint   Patient presents with   • Physical Exam     Patient is here today for an annual exam       History of Present Illness:     Adult Annual Physical   Patient here for a comprehensive physical exam  The patient reports problems - newly diagnosed diabetic  Diet and Physical Activity  Diet/Nutrition: consuming 3-5 servings of fruits/vegetables daily  Exercise: walking  Depression Screening  PHQ-2/9 Depression Screening         General Health  Sleep: sleeps well and gets 7-8 hours of sleep on average  Hearing: normal - bilateral   Vision: goes for regular eye exams  Dental: regular dental visits and brushes teeth twice daily  /GYN Health  Last menstrual period: irregular periods   Contraceptive method: none  History of STDs?: no      Review of Systems:     Review of Systems   Constitutional: Negative  HENT: Negative  Eyes: Negative  Respiratory: Negative  Cardiovascular: Negative  Gastrointestinal: Negative  Negative for abdominal distention  Endocrine: Negative  Genitourinary: Negative  Musculoskeletal: Negative  Allergic/Immunologic: Negative  Neurological: Negative  Hematological: Negative  Psychiatric/Behavioral: Negative  Past Medical History:     Past Medical History:   Diagnosis Date   • Depression    • Migraine    • Obesity    • PCOS (polycystic ovarian syndrome)       Past Surgical History:     Past Surgical History:   Procedure Laterality Date   • CHOLECYSTECTOMY     • WISDOM TOOTH EXTRACTION        Social History:     Social History     Socioeconomic History   • Marital status: Single     Spouse name: None   • Number of children: None   • Years of education: None   • Highest education level: None   Occupational History   • None   Tobacco Use   • Smoking status: Never Smoker   • Smokeless tobacco: Never Used   Vaping Use   • Vaping Use: Never used   Substance and Sexual Activity   • Alcohol use: Never   • Drug use: Never   • Sexual activity: Not Currently   Other Topics Concern   • None   Social History Narrative   • None     Social Determinants of Health     Financial Resource Strain: Not on file   Food Insecurity: Not on file   Transportation Needs: Not on file   Physical Activity: Not on file   Stress: Not on file   Social Connections: Not on file   Intimate Partner Violence: Not on file   Housing Stability: Not on file      Family History:     Family History   Problem Relation Age of Onset   • Diabetes Mother    • Heart attack Mother    • Heart disease Mother    • Heart attack Father    • Heart disease Father    • Diabetes Father       Current Medications:     Current Outpatient Medications   Medication Sig Dispense Refill   • Blood Glucose Monitoring Suppl (OneTouch Verio Reflect) w/Device KIT Check blood sugars once daily  Please substitute with appropriate alternative as covered by patient's insurance  Dx: E11 65 1 kit 0   • glucose blood (OneTouch Verio) test strip Check blood sugars once daily  Please substitute with appropriate alternative as covered by patient's insurance   Dx: E11 65 100 each 3   • metFORMIN (GLUCOPHAGE-XR) 750 mg 24 hr tablet Take 1 tablet (750 mg total) by mouth daily with breakfast 90 tablet 0   • OneTouch Delica Lancets 00H MISC Check blood sugars once daily  Please substitute with appropriate alternative as covered by patient's insurance  Dx: E11 65 100 each 3   • rizatriptan (MAXALT) 10 MG tablet Take 1 tablet (10 mg total) by mouth once as needed for migraine May repeat in 2 hours if needed  Max 2/24 hours, 9/month  9 tablet 6   • Semaglutide,0 25 or 0 5MG/DOS, (Ozempic, 0 25 or 0 5 MG/DOSE,) 2 MG/1 5ML SOPN 0 25 mg weekly for 4 weeks and 0 5 mg weekly after 3 mL 0   • atorvastatin (LIPITOR) 20 mg tablet Take 20 mg by mouth daily (Patient not taking: No sig reported)     • nystatin-triamcinolone (MYCOLOG-II) ointment Apply topically 2 (two) times a day as needed (Patient not taking: No sig reported)       No current facility-administered medications for this visit  Allergies:     No Known Allergies   Physical Exam:     /78 (BP Location: Left arm, Patient Position: Sitting, Cuff Size: Large)   Pulse 92   Temp 98 1 °F (36 7 °C) (Tympanic)   Resp 16   Ht 5' 7 87" (1 724 m)   Wt 132 kg (291 lb 3 2 oz)   SpO2 98%   BMI 44 44 kg/m²     Physical Exam  Vitals and nursing note reviewed  Constitutional:       General: She is not in acute distress  Appearance: Normal appearance  She is well-developed  HENT:      Head: Normocephalic and atraumatic  Right Ear: Tympanic membrane normal       Left Ear: Tympanic membrane normal       Nose: Nose normal       Mouth/Throat:      Mouth: Mucous membranes are moist    Eyes:      Conjunctiva/sclera: Conjunctivae normal    Cardiovascular:      Rate and Rhythm: Normal rate and regular rhythm  Heart sounds: No murmur heard  Pulmonary:      Effort: Pulmonary effort is normal  No respiratory distress  Breath sounds: Normal breath sounds  Abdominal:      Palpations: Abdomen is soft  Tenderness: There is no abdominal tenderness  Musculoskeletal:         General: Normal range of motion  Cervical back: Normal range of motion and neck supple  Skin:     General: Skin is warm and dry  Neurological:      General: No focal deficit present  Mental Status: She is alert and oriented to person, place, and time     Psychiatric:         Mood and Affect: Mood normal          Behavior: Behavior normal           Rodrigo Solano MD   [facility]

## 2022-11-11 LAB
LEFT EYE DIABETIC RETINOPATHY: NORMAL
LEFT EYE IMAGE QUALITY: NORMAL
LEFT EYE MACULAR EDEMA: NORMAL
LEFT EYE OTHER RETINOPATHY: NORMAL
RIGHT EYE DIABETIC RETINOPATHY: NORMAL
RIGHT EYE IMAGE QUALITY: NORMAL
RIGHT EYE MACULAR EDEMA: NORMAL
RIGHT EYE OTHER RETINOPATHY: NORMAL
SEVERITY (EYE EXAM): NORMAL

## 2022-11-18 ENCOUNTER — TELEPHONE (OUTPATIENT)
Dept: FAMILY MEDICINE CLINIC | Facility: CLINIC | Age: 32
End: 2022-11-18

## 2022-11-18 NOTE — TELEPHONE ENCOUNTER
Patient is returning [address] [facility] call  She is working and will try to answer if she receives another call

## 2022-11-18 NOTE — TELEPHONE ENCOUNTER
Pt called in stating that she had just received a missed call and voicemail telling her to call the office  There are no notes in pt charts in regards to a call being made today  Please give pt a call to follow up

## 2022-11-21 ENCOUNTER — TELEPHONE (OUTPATIENT)
Dept: FAMILY MEDICINE CLINIC | Facility: CLINIC | Age: 32
End: 2022-11-21

## 2022-11-21 NOTE — TELEPHONE ENCOUNTER
Called patient to check on addition of metformin and ozempic per Dr Malvin Goldberg 11/10  Requested return call         Pharmacist Tracking Tool  Reason For Outreach: Embedded Pharmacist  Demographics:  Intervention Method: Phone  Type of Intervention: New  Topics Addressed: Diabetes  Pharmacologic Interventions: Prevent or Manage JEAN CLAUDE  Non-Pharmacologic Interventions: Adherence addressed, Care coordination, Disease state education, Home Monitoring and Medication/Device education  Time:  Direct Patient Care: 5 mins  Care Coordination: 30 mins  Recommendation Recipient: Patient/Caregiver  Outcome: Pending/ Follow-up Required

## 2023-03-24 ENCOUNTER — APPOINTMENT (EMERGENCY)
Dept: ULTRASOUND IMAGING | Facility: HOSPITAL | Age: 33
End: 2023-03-24

## 2023-03-24 ENCOUNTER — APPOINTMENT (EMERGENCY)
Dept: CT IMAGING | Facility: HOSPITAL | Age: 33
End: 2023-03-24

## 2023-03-24 ENCOUNTER — HOSPITAL ENCOUNTER (EMERGENCY)
Facility: HOSPITAL | Age: 33
Discharge: HOME | End: 2023-03-24
Attending: EMERGENCY MEDICINE

## 2023-03-24 VITALS
WEIGHT: 290.13 LBS | OXYGEN SATURATION: 97 % | RESPIRATION RATE: 16 BRPM | DIASTOLIC BLOOD PRESSURE: 101 MMHG | TEMPERATURE: 98.7 F | SYSTOLIC BLOOD PRESSURE: 157 MMHG | HEART RATE: 83 BPM | BODY MASS INDEX: 44.28 KG/M2

## 2023-03-24 DIAGNOSIS — N80.03 ADENOMYOSIS: Primary | ICD-10-CM

## 2023-03-24 LAB
ALBUMIN SERPL BCP-MCNC: 3.9 G/DL (ref 3.5–5)
ALP SERPL-CCNC: 56 U/L (ref 34–104)
ALT SERPL W P-5'-P-CCNC: 26 U/L (ref 7–52)
ANION GAP SERPL CALCULATED.3IONS-SCNC: 8 MMOL/L (ref 4–13)
AST SERPL W P-5'-P-CCNC: 20 U/L (ref 13–39)
BACTERIA UR QL AUTO: ABNORMAL /HPF
BASOPHILS # BLD AUTO: 0.06 THOUSANDS/ÂΜL (ref 0–0.1)
BASOPHILS NFR BLD AUTO: 1 % (ref 0–1)
BILIRUB SERPL-MCNC: 0.48 MG/DL (ref 0.2–1)
BILIRUB UR QL STRIP: NEGATIVE
BUN SERPL-MCNC: 7 MG/DL (ref 5–25)
CALCIUM SERPL-MCNC: 8.5 MG/DL (ref 8.4–10.2)
CHLORIDE SERPL-SCNC: 103 MMOL/L (ref 96–108)
CLARITY UR: ABNORMAL
CO2 SERPL-SCNC: 27 MMOL/L (ref 21–32)
COLOR UR: ABNORMAL
CREAT SERPL-MCNC: 0.51 MG/DL (ref 0.6–1.3)
EOSINOPHIL # BLD AUTO: 0.17 THOUSAND/ÂΜL (ref 0–0.61)
EOSINOPHIL NFR BLD AUTO: 2 % (ref 0–6)
ERYTHROCYTE [DISTWIDTH] IN BLOOD BY AUTOMATED COUNT: 13.1 % (ref 11.6–15.1)
EXT PREGNANCY TEST URINE: NEGATIVE
EXT. CONTROL: NORMAL
GFR SERPL CREATININE-BSD FRML MDRD: 127 ML/MIN/1.73SQ M
GLUCOSE SERPL-MCNC: 178 MG/DL (ref 65–140)
GLUCOSE UR STRIP-MCNC: ABNORMAL MG/DL
HCT VFR BLD AUTO: 42.3 % (ref 34.8–46.1)
HGB BLD-MCNC: 14.1 G/DL (ref 11.5–15.4)
HGB UR QL STRIP.AUTO: ABNORMAL
IMM GRANULOCYTES # BLD AUTO: 0.11 THOUSAND/UL (ref 0–0.2)
IMM GRANULOCYTES NFR BLD AUTO: 1 % (ref 0–2)
KETONES UR STRIP-MCNC: NEGATIVE MG/DL
LEUKOCYTE ESTERASE UR QL STRIP: ABNORMAL
LIPASE SERPL-CCNC: 15 U/L (ref 11–82)
LYMPHOCYTES # BLD AUTO: 2.89 THOUSANDS/ÂΜL (ref 0.6–4.47)
LYMPHOCYTES NFR BLD AUTO: 30 % (ref 14–44)
MCH RBC QN AUTO: 27.2 PG (ref 26.8–34.3)
MCHC RBC AUTO-ENTMCNC: 33.3 G/DL (ref 31.4–37.4)
MCV RBC AUTO: 82 FL (ref 82–98)
MONOCYTES # BLD AUTO: 0.39 THOUSAND/ÂΜL (ref 0.17–1.22)
MONOCYTES NFR BLD AUTO: 4 % (ref 4–12)
NEUTROPHILS # BLD AUTO: 6.01 THOUSANDS/ÂΜL (ref 1.85–7.62)
NEUTS SEG NFR BLD AUTO: 62 % (ref 43–75)
NITRITE UR QL STRIP: NEGATIVE
NON-SQ EPI CELLS URNS QL MICRO: ABNORMAL /HPF
NRBC BLD AUTO-RTO: 0 /100 WBCS
PH UR STRIP.AUTO: 5.5 [PH]
PLATELET # BLD AUTO: 292 THOUSANDS/UL (ref 149–390)
PMV BLD AUTO: 9.7 FL (ref 8.9–12.7)
POTASSIUM SERPL-SCNC: 3.9 MMOL/L (ref 3.5–5.3)
PROT SERPL-MCNC: 6.5 G/DL (ref 6.4–8.4)
PROT UR STRIP-MCNC: ABNORMAL MG/DL
RBC # BLD AUTO: 5.18 MILLION/UL (ref 3.81–5.12)
RBC #/AREA URNS AUTO: ABNORMAL /HPF
SODIUM SERPL-SCNC: 138 MMOL/L (ref 135–147)
SP GR UR STRIP.AUTO: 1.02 (ref 1–1.03)
UROBILINOGEN UR QL STRIP.AUTO: 0.2 E.U./DL
WBC # BLD AUTO: 9.63 THOUSAND/UL (ref 4.31–10.16)
WBC #/AREA URNS AUTO: ABNORMAL /HPF

## 2023-03-24 RX ADMIN — IOHEXOL 100 ML: 350 INJECTION, SOLUTION INTRAVENOUS at 12:19

## 2023-03-24 NOTE — Clinical Note
Yadi Birmingham was seen and treated in our emergency department on 3/24/2023  Diagnosis:     Jule Koyanagi    She may return on this date: If you have any questions or concerns, please don't hesitate to call        Simon Thurman MD    ______________________________           _______________          _______________  Hospital Representative                              Date                                Time

## 2023-03-24 NOTE — ED PROVIDER NOTES
History  Chief Complaint   Patient presents with   • Vaginal Bleeding     Pt presents to the ED with c/o abd pain/vaginal bleeding  Pt reports getting her period on 3/21/23  Wednesday she woke up with severe back pain  This is a 26-year-old female presenting due to heavy vaginal bleeding  Pt starting on her period 3/21 and since has had much more bleeding than her normal period  Patient states yesterday she was double stacking menstrual pads and bleeding through her close, she used a whole box of pads yesterday  Patient states today the bleeding has slowed down but is still heavy  Patient endorses lightheadedness, non-localized abdominal pain that was in b/l flanks yesterday and now more so in her lower abdomen  Pt states she was having symptoms of vulvovaginal candidiasis before bleeding started but is currently hard to tell from all the bleeding  Prior to Admission Medications   Prescriptions Last Dose Informant Patient Reported? Taking? Blood Glucose Monitoring Suppl (OneTouch Verio Reflect) w/Device KIT   No No   Sig: Check blood sugars once daily  Please substitute with appropriate alternative as covered by patient's insurance  Dx: I04 25   OneTouch Delica Lancets 31T MISC   No No   Sig: Check blood sugars once daily  Please substitute with appropriate alternative as covered by patient's insurance  Dx: E11 65   Semaglutide,0 25 or 0 5MG/DOS, (Ozempic, 0 25 or 0 5 MG/DOSE,) 2 MG/1 5ML SOPN   No No   Si 25 mg weekly for 4 weeks and 0 5 mg weekly after   atorvastatin (LIPITOR) 20 mg tablet   Yes No   Sig: Take 20 mg by mouth daily   Patient not taking: No sig reported   glucose blood (OneTouch Verio) test strip   No No   Sig: Check blood sugars once daily  Please substitute with appropriate alternative as covered by patient's insurance   Dx: E11 65   metFORMIN (GLUCOPHAGE-XR) 750 mg 24 hr tablet   No No   Sig: Take 1 tablet (750 mg total) by mouth daily with breakfast nystatin-triamcinolone (MYCOLOG-II) ointment   Yes No   Sig: Apply topically 2 (two) times a day as needed   Patient not taking: No sig reported   rizatriptan (MAXALT) 10 MG tablet   No No   Sig: Take 1 tablet (10 mg total) by mouth once as needed for migraine May repeat in 2 hours if needed  Max 2/24 hours, 9/month  Facility-Administered Medications: None       Past Medical History:   Diagnosis Date   • Depression    • Migraine    • Obesity    • PCOS (polycystic ovarian syndrome)        Past Surgical History:   Procedure Laterality Date   • CHOLECYSTECTOMY     • WISDOM TOOTH EXTRACTION         Family History   Problem Relation Age of Onset   • Diabetes Mother    • Heart attack Mother    • Heart disease Mother    • Heart attack Father    • Heart disease Father    • Diabetes Father      I have reviewed and agree with the history as documented  E-Cigarette/Vaping   • E-Cigarette Use Never User      E-Cigarette/Vaping Substances   • Nicotine No    • THC No    • CBD No    • Flavoring No    • Other No    • Unknown No      Social History     Tobacco Use   • Smoking status: Never   • Smokeless tobacco: Never   Vaping Use   • Vaping Use: Never used   Substance Use Topics   • Alcohol use: Never   • Drug use: Never        Review of Systems    Physical Exam  ED Triage Vitals [03/24/23 0954]   Temperature Pulse Respirations Blood Pressure SpO2   98 7 °F (37 1 °C) 83 16 (!) 157/101 97 %      Temp Source Heart Rate Source Patient Position - Orthostatic VS BP Location FiO2 (%)   Oral Monitor -- Right arm --      Pain Score       --             Orthostatic Vital Signs  Vitals:    03/24/23 0954   BP: (!) 157/101   Pulse: 83       Physical Exam  Vitals and nursing note reviewed  Constitutional:       General: She is not in acute distress  Appearance: She is well-developed  HENT:      Head: Normocephalic and atraumatic  Nose: Nose normal  No congestion     Eyes:      Extraocular Movements: Extraocular movements intact  Conjunctiva/sclera: Conjunctivae normal    Cardiovascular:      Rate and Rhythm: Normal rate and regular rhythm  Pulses: Normal pulses  Heart sounds: Normal heart sounds  No murmur heard  Pulmonary:      Effort: Pulmonary effort is normal  No respiratory distress  Breath sounds: Normal breath sounds  Chest:      Chest wall: No tenderness  Abdominal:      General: Abdomen is flat  Bowel sounds are normal       Palpations: Abdomen is soft  Tenderness: There is abdominal tenderness  There is no right CVA tenderness or left CVA tenderness  Genitourinary:     Comments: Pelvic exam- large amount of blood in vaginal canal, could not appreciate cervix due to pooling of blood  No clear signs of infection  Musculoskeletal:         General: No deformity or signs of injury  Normal range of motion  Cervical back: Normal range of motion and neck supple  No rigidity or tenderness  Skin:     General: Skin is warm and dry  Findings: No bruising, lesion or rash  Neurological:      General: No focal deficit present  Mental Status: She is alert  Cranial Nerves: No cranial nerve deficit  Sensory: No sensory deficit           ED Medications  Medications - No data to display    Diagnostic Studies  Results Reviewed     Procedure Component Value Units Date/Time    Comprehensive metabolic panel [[id number]]  (Abnormal) Collected: 03/24/23 1015    Lab Status: Final result Specimen: Blood from Arm, Left Updated: 03/24/23 1045     Sodium 138 mmol/L      Potassium 3 9 mmol/L      Chloride 103 mmol/L      CO2 27 mmol/L      ANION GAP 8 mmol/L      BUN 7 mg/dL      Creatinine 0 51 mg/dL      Glucose 178 mg/dL      Calcium 8 5 mg/dL      AST 20 U/L      ALT 26 U/L      Alkaline Phosphatase 56 U/L      Total Protein 6 5 g/dL      Albumin 3 9 g/dL      Total Bilirubin 0 48 mg/dL      eGFR 127 ml/min/1 73sq m     Narrative:      [place] guidelines for Chronic Kidney Disease (CKD):   •  Stage 1 with normal or high GFR (GFR > 90 mL/min/1 73 square meters)  •  Stage 2 Mild CKD (GFR = 60-89 mL/min/1 73 square meters)  •  Stage 3A Moderate CKD (GFR = 45-59 mL/min/1 73 square meters)  •  Stage 3B Moderate CKD (GFR = 30-44 mL/min/1 73 square meters)  •  Stage 4 Severe CKD (GFR = 15-29 mL/min/1 73 square meters)  •  Stage 5 End Stage CKD (GFR <15 mL/min/1 73 square meters)  Note: GFR calculation is accurate only with a steady state creatinine    Lipase [[id number]]  (Normal) Collected: 03/24/23 1015    Lab Status: Final result Specimen: Blood from Arm, Left Updated: 03/24/23 1045     Lipase 15 u/L     CBC and differential [[id number]]  (Abnormal) Collected: 03/24/23 1015    Lab Status: Final result Specimen: Blood from Arm, Left Updated: 03/24/23 1033     WBC 9 63 Thousand/uL      RBC 5 18 Million/uL      Hemoglobin 14 1 g/dL      Hematocrit 42 3 %      MCV 82 fL      MCH 27 2 pg      MCHC 33 3 g/dL      RDW 13 1 %      MPV 9 7 fL      Platelets 747 Thousands/uL      nRBC 0 /100 WBCs      Neutrophils Relative 62 %      Immat GRANS % 1 %      Lymphocytes Relative 30 %      Monocytes Relative 4 %      Eosinophils Relative 2 %      Basophils Relative 1 %      Neutrophils Absolute 6 01 Thousands/µL      Immature Grans Absolute 0 11 Thousand/uL      Lymphocytes Absolute 2 89 Thousands/µL      Monocytes Absolute 0 39 Thousand/µL      Eosinophils Absolute 0 17 Thousand/µL      Basophils Absolute 0 06 Thousands/µL                  No orders to display         Procedures  Procedures      ED Course                                       MDM      Disposition  Final diagnoses:   None     ED Disposition     None      Follow-up Information    None         Patient's Medications   Discharge Prescriptions    No medications on file     No discharge procedures on file  PDMP Review     None           ED Provider  Attending physically available and evaluated Amarilis Morales I managed the patient along with the ED Attending      Electronically Signed by Final result Specimen: Urine, Clean Catch Updated: 03/24/23 1243     Color, UA Red     Clarity, UA Turbid     Specific [place], UA 1 018     pH, UA 5 5     Leukocytes, UA Moderate     Nitrite, UA Negative     Protein,  (2+) mg/dl      Glucose, UA 50 (1/20%) mg/dl      Ketones, UA Negative mg/dl      Urobilinogen, UA 0 2 E U /dl      Bilirubin, UA Negative     Occult Blood, UA Large    POCT pregnancy, urine [[id number]]  (Normal) Resulted: 03/24/23 1126    Lab Status: Final result Updated: 03/24/23 1126     EXT Preg Test, Ur Negative     Control Valid    Comprehensive metabolic panel [[id number]]  (Abnormal) Collected: 03/24/23 1015    Lab Status: Final result Specimen: Blood from Arm, Left Updated: 03/24/23 1045     Sodium 138 mmol/L      Potassium 3 9 mmol/L      Chloride 103 mmol/L      CO2 27 mmol/L      ANION GAP 8 mmol/L      BUN 7 mg/dL      Creatinine 0 51 mg/dL      Glucose 178 mg/dL      Calcium 8 5 mg/dL      AST 20 U/L      ALT 26 U/L      Alkaline Phosphatase 56 U/L      Total Protein 6 5 g/dL      Albumin 3 9 g/dL      Total Bilirubin 0 48 mg/dL      eGFR 127 ml/min/1 73sq m     Narrative:      [place] guidelines for Chronic Kidney Disease (CKD):   •  Stage 1 with normal or high GFR (GFR > 90 mL/min/1 73 square meters)  •  Stage 2 Mild CKD (GFR = 60-89 mL/min/1 73 square meters)  •  Stage 3A Moderate CKD (GFR = 45-59 mL/min/1 73 square meters)  •  Stage 3B Moderate CKD (GFR = 30-44 mL/min/1 73 square meters)  •  Stage 4 Severe CKD (GFR = 15-29 mL/min/1 73 square meters)  •  Stage 5 End Stage CKD (GFR <15 mL/min/1 73 square meters)  Note: GFR calculation is accurate only with a steady state creatinine    Lipase [[id number]]  (Normal) Collected: 03/24/23 1015    Lab Status: Final result Specimen: Blood from Arm, Left Updated: 03/24/23 1045     Lipase 15 u/L     CBC and differential [[id number]]  (Abnormal) Collected: 03/24/23 1015    Lab Status: Final result Specimen: Blood from Arm, Left Updated: 03/24/23 1033     WBC 9 63 Thousand/uL      RBC 5 18 Million/uL      Hemoglobin 14 1 g/dL      Hematocrit 42 3 %      MCV 82 fL      MCH 27 2 pg      MCHC 33 3 g/dL      RDW 13 1 %      MPV 9 7 fL      Platelets 735 Thousands/uL      nRBC 0 /100 WBCs      Neutrophils Relative 62 %      Immat GRANS % 1 %      Lymphocytes Relative 30 %      Monocytes Relative 4 %      Eosinophils Relative 2 %      Basophils Relative 1 %      Neutrophils Absolute 6 01 Thousands/µL      Immature Grans Absolute 0 11 Thousand/uL      Lymphocytes Absolute 2 89 Thousands/µL      Monocytes Absolute 0 39 Thousand/µL      Eosinophils Absolute 0 17 Thousand/µL      Basophils Absolute 0 06 Thousands/µL                  US pelvis complete w transvaginal   Final Result by Maninder Pollard MD (03/24 1416)       Normal endometrial caliber with minimal endometrial flow, likely within normal limits in this menstruating female  Findings suggesting adenomyosis  Workstation performed: [id number]         CT abdomen pelvis with contrast   Final Result by Joleen Nino MD (03/24 1405)   No acute findings with a normal appendix identified  Workstation performed: [id number]               Procedures  Procedures      ED Course  ED Course as of 03/27/23 2048   Fri Mar 24, 2023   1420 CBC pt not anemic, WBC not elevated  Normal plt count  Glu 178 but CMP otherwise unremarkable  1426 US pelvis complete w transvaginal  FINDINGS:     UTERUS:  The uterus is anteverted in position, measuring 11 3 x 5 5 x 6 2 cm  The uterus has a somewhat bulbous contour and coarsened heterogeneous echotexture  The cervix appears within normal limits      ENDOMETRIUM:    The endometrial echo complex has an AP caliber of 8 0 mm  Its appearance is within normal limits for age and cycle and shows no filling defects    Minimal flow within the endometrium and likely within normal limits in this menstruating female      OVARIES/ADNEXA:  Right ovary:  4 1 x 2 6 x 2 8 cm  16 1 mL  Left ovary:  3 3 x 2 8 x 2 9 cm  14 1 mL  Ovarian Doppler flow is within normal limits  No suspicious ovarian or adnexal abnormality      OTHER:  No free fluid or loculated fluid collections         IMPRESSION:     Normal endometrial caliber with minimal endometrial flow, likely within normal limits in this menstruating female      Findings suggesting adenomyosis        Workstation performed: [id number]   8360 CT abdomen pelvis with contrast  FINDINGS:     ABDOMEN     LOWER CHEST:  No clinically significant abnormality identified in the visualized lower chest      LIVER/BILIARY TREE:  Liver is diffusely decreased in density consistent with fatty change  No CT evidence of suspicious hepatic mass  Normal hepatic contours  No biliary dilatation      GALLBLADDER:  Gallbladder is surgically absent      SPLEEN:  Unremarkable      PANCREAS:  Unremarkable      ADRENAL GLANDS:  Unremarkable      KIDNEYS/URETERS:  Unremarkable  No hydronephrosis      STOMACH AND BOWEL:  Unremarkable      APPENDIX:  A normal appendix was visualized      ABDOMINOPELVIC CAVITY:  No ascites  No pneumoperitoneum  No lymphadenopathy      VESSELS:  Unremarkable for patient's age      PELVIS     REPRODUCTIVE ORGANS:  Unremarkable for patient's age      URINARY BLADDER:  Unremarkable      ABDOMINAL WALL/INGUINAL REGIONS:  Unremarkable      OSSEOUS STRUCTURES:  No acute fracture or destructive osseous lesion      IMPRESSION:  No acute findings with a normal appendix identified               Workstation performed: [id number]   1464 Offered pt referral to [address] [place] [address] but pt is already being seen in at [facility] and would like to stay with them  Suggested giving them a call to get a follow up appointment ASAP for reassessment and management of symptoms  Pt is agreeable and appropriate for d/c  Return precautions discussed     1920 Urine Microscopic(!)  Discussed with pt elevated WBC un urine but pt is not having any symp of UTI at this time  Instructed to monitor symptoms and contact her PCP if she develops and dysuria, fever, chills, lower abd pain consistent with UTI  MDM      Disposition  Final diagnoses:   Adenomyosis     Time reflects when diagnosis was documented in both MDM as applicable and the Disposition within this note     Time User Action Codes Description Comment    3/24/2023  2:38 PM Harriet Colon Add [V56 29] Adenomyosis       ED Disposition     ED Disposition   Discharge    Condition   Stable    Date/Time   Fri Mar 24, 2023  2:38 PM    Comment   Renee Ocampo discharge to home/self care  Follow-up Information     Follow up With Specialties Details Why Contact Info Additional Information    Pascual Wasserman MD Family Medicine   [id number]   [address] [facility] [id number]       [facility] Emergency Medicine   [address] Emergency Department,  [address], [place], [place], [address]          Discharge Medication List as of 3/24/2023  2:44 PM      CONTINUE these medications which have NOT CHANGED    Details   atorvastatin (LIPITOR) 20 mg tablet Take 20 mg by mouth daily, Starting Wed 12/22/2021, Until Thu 12/22/2022, Historical Med      Blood Glucose Monitoring Suppl (OneTouch Verio Reflect) w/Device KIT Check blood sugars once daily  Please substitute with appropriate alternative as covered by patient's insurance  Dx: E11 65, Normal      glucose blood (OneTouch Verio) test strip Check blood sugars once daily  Please substitute with appropriate alternative as covered by patient's insurance   Dx: E11 65, Normal      metFORMIN (GLUCOPHAGE-XR) 750 mg 24 hr tablet Take 1 tablet (750 mg total) by mouth daily with breakfast, Starting Thu 11/10/2022, Normal nystatin-triamcinolone (MYCOLOG-II) ointment Apply topically 2 (two) times a day as needed, Starting Mon 2/14/2022, Until Tue 2/14/2023 at 2359, Historical Med      OneTouch Delica Lancets 20H MISC Check blood sugars once daily  Please substitute with appropriate alternative as covered by patient's insurance  Dx: E11 65, Normal      rizatriptan (MAXALT) 10 MG tablet Take 1 tablet (10 mg total) by mouth once as needed for migraine May repeat in 2 hours if needed  Max 2/24 hours, 9/month , Starting Wed 3/2/2022, Normal      Semaglutide,0 25 or 0 5MG/DOS, (Ozempic, 0 25 or 0 5 MG/DOSE,) 2 MG/1 5ML SOPN 0 25 mg weekly for 4 weeks and 0 5 mg weekly after, Normal           No discharge procedures on file  PDMP Review     None           ED Provider  Attending physically available and evaluated Liberty Selene ALBRIGHT managed the patient along with the ED Attending      Electronically Signed by         Noel Bond MD  03/27/23 4618

## 2023-03-24 NOTE — DISCHARGE INSTRUCTIONS
Please call your Ob/Gyn and try to get an appointment ASAP for reassessment and management  Monitor symptoms and return top the Emergency Department if you develop severe worsening abdominal pain, vaginal discharge, lightheadedness, shortness of breath, fever, chills, nausea, vomiting  Thank you for allowing us to take part in your care

## 2023-03-25 LAB
CANDIDA RRNA VAG QL PROBE: NEGATIVE
G VAGINALIS RRNA GENITAL QL PROBE: NEGATIVE
T VAGINALIS RRNA GENITAL QL PROBE: NEGATIVE

## 2023-03-26 LAB
BACTERIA UR CULT: ABNORMAL
BACTERIA UR CULT: ABNORMAL
C TRACH DNA SPEC QL NAA+PROBE: ABNORMAL
N GONORRHOEA DNA SPEC QL NAA+PROBE: ABNORMAL

## 2023-03-28 NOTE — RESULT ENCOUNTER NOTE
Called and informed pt of invalid gc/chlamydia specimen  Plans to f/u with obgyn for retest, although is not concerned since she is not currently sexually active  She also plans to f/u with obgyn for adenomyosis seen on US  She plans to f/u with PCP regarding uncontrolled DM  She reports the most recent med prescribed to her wasn't covered, so needs to discuss with her PCP   Will CC PCP

## 2023-12-15 NOTE — ED PROVIDER NOTE - CONSTITUTIONAL NEGATIVE STATEMENT, MLM
Quality 47: Advance Care Plan: Advance Care Planning discussed and documented; advance care plan or surrogate decision maker documented in the medical record.
Quality 110: Preventive Care And Screening: Influenza Immunization: Influenza Immunization previously received during influenza season
Quality 226: Preventive Care And Screening: Tobacco Use: Screening And Cessation Intervention: Patient screened for tobacco use and is an ex/non-smoker
Detail Level: Detailed
Quality 130: Documentation Of Current Medications In The Medical Record: Current Medications Documented
Quality 111:Pneumonia Vaccination Status For Older Adults: Patient received any pneumococcal conjugate or polysaccharide vaccine on or after their 60th birthday and before the end of the measurement period
Quality 431: Preventive Care And Screening: Unhealthy Alcohol Use - Screening: Patient not identified as an unhealthy alcohol user when screened for unhealthy alcohol use using a systematic screening method
no fever and no chills.

## 2024-05-06 ENCOUNTER — OFFICE VISIT (OUTPATIENT)
Dept: FAMILY MEDICINE CLINIC | Facility: CLINIC | Age: 34
End: 2024-05-06
Payer: COMMERCIAL

## 2024-05-06 VITALS
OXYGEN SATURATION: 98 % | BODY MASS INDEX: 42.98 KG/M2 | HEART RATE: 102 BPM | WEIGHT: 283.6 LBS | SYSTOLIC BLOOD PRESSURE: 158 MMHG | DIASTOLIC BLOOD PRESSURE: 90 MMHG | HEIGHT: 68 IN | RESPIRATION RATE: 16 BRPM | TEMPERATURE: 99.7 F

## 2024-05-06 DIAGNOSIS — E11.65 TYPE 2 DIABETES MELLITUS WITH HYPERGLYCEMIA, WITHOUT LONG-TERM CURRENT USE OF INSULIN (HCC): ICD-10-CM

## 2024-05-06 DIAGNOSIS — Z00.00 ANNUAL PHYSICAL EXAM: Primary | ICD-10-CM

## 2024-05-06 DIAGNOSIS — E28.2 PCOS (POLYCYSTIC OVARIAN SYNDROME): ICD-10-CM

## 2024-05-06 DIAGNOSIS — E55.9 VITAMIN D DEFICIENCY: ICD-10-CM

## 2024-05-06 DIAGNOSIS — E11.620 NECROBIOSIS DIABETICORUM (HCC): ICD-10-CM

## 2024-05-06 DIAGNOSIS — F32.0 MILD MAJOR DEPRESSION, SINGLE EPISODE (HCC): ICD-10-CM

## 2024-05-06 DIAGNOSIS — E66.01 MORBID (SEVERE) OBESITY DUE TO EXCESS CALORIES (HCC): ICD-10-CM

## 2024-05-06 DIAGNOSIS — R10.13 DYSPEPSIA: ICD-10-CM

## 2024-05-06 DIAGNOSIS — G43.009 MIGRAINE WITHOUT AURA AND WITHOUT STATUS MIGRAINOSUS, NOT INTRACTABLE: ICD-10-CM

## 2024-05-06 DIAGNOSIS — G47.33 OSA (OBSTRUCTIVE SLEEP APNEA): ICD-10-CM

## 2024-05-06 DIAGNOSIS — R53.83 FATIGUE, UNSPECIFIED TYPE: ICD-10-CM

## 2024-05-06 LAB — SL AMB POCT HEMOGLOBIN AIC: 11.2 (ref ?–6.5)

## 2024-05-06 PROCEDURE — 99395 PREV VISIT EST AGE 18-39: CPT | Performed by: FAMILY MEDICINE

## 2024-05-06 PROCEDURE — 83036 HEMOGLOBIN GLYCOSYLATED A1C: CPT | Performed by: FAMILY MEDICINE

## 2024-05-06 PROCEDURE — 99214 OFFICE O/P EST MOD 30 MIN: CPT | Performed by: FAMILY MEDICINE

## 2024-05-06 RX ORDER — PANTOPRAZOLE SODIUM 40 MG/1
40 TABLET, DELAYED RELEASE ORAL DAILY
Qty: 30 TABLET | Refills: 0 | Status: SHIPPED | OUTPATIENT
Start: 2024-05-06

## 2024-05-06 RX ORDER — METFORMIN HYDROCHLORIDE 500 MG/1
TABLET, EXTENDED RELEASE ORAL
Qty: 77 TABLET | Refills: 0 | Status: SHIPPED | OUTPATIENT
Start: 2024-05-06 | End: 2024-06-03

## 2024-05-06 NOTE — PROGRESS NOTES
Diabetic Foot Exam    Patient's shoes and socks removed.    Right Foot/Ankle   Right Foot Inspection  Skin Exam: skin normal and skin intact. No dry skin, no warmth, no callus, no erythema, no maceration, no abnormal color, no pre-ulcer, no ulcer and no callus.     Toe Exam: ROM and strength within normal limits and swelling.     Sensory   Vibration: intact  Proprioception: intact  Monofilament testing: intact    Vascular  Capillary refills: < 3 seconds  The right DP pulse is 2+.     Left Foot/Ankle  Left Foot Inspection  Skin Exam: skin normal and skin intact. No dry skin, no warmth, no erythema, no maceration, normal color, no pre-ulcer, no ulcer and no callus.     Toe Exam: ROM and strength within normal limits.     Sensory   Vibration: intact  Proprioception: intact  Monofilament testing: intact    Vascular  Capillary refills: < 3 seconds  The left DP pulse is 2+.     Assign Risk Category  No deformity present  No loss of protective sensation  No weak pulses  Risk: 0      ADULT ANNUAL PHYSICAL  [facility]    NAME: Sari Hernandez  AGE: 33 y.o. SEX: female  : 1990     DATE: 2024     Assessment and Plan:     Problem List Items Addressed This Visit       Morbid (severe) obesity due to excess calories (HCC)  Discussed adding GLP agonist. Ozempic prescribed in the past but not covered. Asked to contact insurance with list of approved GLPs agonist.    Migraine without aura and without status migrainosus, not intractable    PCOS (polycystic ovarian syndrome)  Restart metformin. Titration schedule reviewed. Also reccomend inositol     Relevant Medications    metFORMIN (GLUCOPHAGE-XR) 500 mg 24 hr tablet    ABRAN (obstructive sleep apnea)  Saw Sleep medicine in the past. Home study confirmed diagnosis but the in lab study was suggest. Unfortunately, she didnt follow up with the study. Explained the importance of managing sleep apnea. Refer to different sleep  specialist     Relevant Orders    Ambulatory Referral to Sleep Medicine    CBC and differential    Type 2 diabetes mellitus with hyperglycemia, without long-term current use of insulin (HCC) - Primary  Uncontrolled. A1c> 11. Off medications since 2022. Resume metformin. Asked to get BW asap to assess kidney and liver function. Will aslo add GLP depending on coverage. BP elevated and asked to monitor pressures. Will hold on starting ACE or arb until I have most current kidney function. Ask to sed log via [facility] in 1 week. Goal is less than 130/80     Relevant Medications    metFORMIN (GLUCOPHAGE-XR) 500 mg 24 hr tablet    Other Relevant Orders    POCT hemoglobin A1c (Completed)    Albumin / creatinine urine ratio    Comprehensive metabolic panel    Vitamin B12     Other Visit Diagnoses       Dyspepsia        Will trial PPI and suggest anti-GERD diet    Relevant Medications    pantoprazole (PROTONIX) 40 mg tablet    Vitamin D deficiency        Relevant Orders    Vitamin D 25 hydroxy    TSH, 3rd generation with Free T4 reflex    Vitamin B12    Iron Panel (Includes Ferritin, Iron Sat%, Iron, and TIBC)    CBC and differential    Fatigue, unspecified type        Chronic. Possibly due to uncontrolled DM vs nutrional deficiency vs anemia in setting of adenomyosis and menorrhagia. Check labs    Relevant Orders    Vitamin D 25 hydroxy    Necrobiosis diabeticorum (HCC)        Ulcerated lesion on the right shin for 1.5 years. History of DM. 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 Will trial top steroids and if ineffective,                  Immunizations and preventive care screenings were discussed with patient today. Appropriate education was printed on patient's after visit summary.    Counseling:  Dental Health: discussed importance of regular tooth brushing, flossing, and dental visits.  Exercise: the importance of regular exercise/physical activity was discussed. Recommend exercise 3-5 times per week for at  least 30 minutes.          Return in 3 months (on 2024) for Diabetes Follow-up.     Chief Complaint:     Chief Complaint   Patient presents with    Physical Exam    Care Gap Request     A1C  Albimin/Creatinine Ratio  GFR  DM foot  PHQ        History of Present Illness:     Adult Annual Physical   Patient here for a comprehensive physical exam.  History of ABRAN, migraines, and DM  Here to re-establish care. Saw Dr. Joya in the past   Has not taken any of her med since   Does photography and likes to walk   Has been dealing vertigo. Had testing in the past that was unrevealing ( MRI and destiney hallpike)  BP elevated  Feels lightheaded at times      Diet and Physical Activity  Diet/Nutrition:  will experience early satiety and GERD symptoms  . Did you NSAIDs regularly in the past due to migraines   Exercise: walking.      Depression Screening  PHQ-2/9 Depression Screening    Little interest or pleasure in doing things: 1 - several days  Feeling down, depressed, or hopeless: 2 - more than half the days  Trouble falling or staying asleep, or sleeping too much: 1 - several days  Feeling tired or having little energy: 2 - more than half the days  Poor appetite or overeatin - not at all  Feeling bad about yourself - or that you are a failure or have let yourself or your family down: 1 - several days  Trouble concentrating on things, such as reading the newspaper or watching television: 1 - several days  Moving or speaking so slowly that other people could have noticed. Or the opposite - being so fidgety or restless that you have been moving around a lot more than usual: 0 - not at all  Thoughts that you would be better off dead, or of hurting yourself in some way: 0 - not at all  PHQ-2 Score: 3  PHQ-2 Interpretation: POSITIVE depression screen  PHQ-9 Score: 8  PHQ-9 Interpretation: Mild depression       General Health  Sleep:  6 hours and doesn't feel rested .   Hearing: normal - bilateral.Ringing in the right ear  but no hearing loss   Vision: wears glasses and last seen in Dec  Dental:  Nov and was going every month for dental work  .       /GYN Health  Follows with gynecology? Yes and seen last year. Has an appt in July  Last menstrual period: Feb. Was missing periods but is now happening more regularly   Contraceptive method:  none  .  History of STDs?: no.      Review of Systems:     Review of Systems   Past Medical History:     Past Medical History:   Diagnosis Date    Depression     Migraine     Obesity     PCOS (polycystic ovarian syndrome)       Past Surgical History:     Past Surgical History:   Procedure Laterality Date    CHOLECYSTECTOMY      WISDOM TOOTH EXTRACTION        Social History:     Social History     Socioeconomic History    Marital status: Single     Spouse name: None    Number of children: None    Years of education: None    Highest education level: None   Occupational History    None   Tobacco Use    Smoking status: Never    Smokeless tobacco: Never   Vaping Use    Vaping status: Never Used   Substance and Sexual Activity    Alcohol use: Never    Drug use: Never    Sexual activity: Not Currently   Other Topics Concern    None   Social History Narrative    None     Social Determinants of Health     Financial Resource Strain: Not on file   Food Insecurity: Not on file   Transportation Needs: Not on file   Physical Activity: Not on file   Stress: Not on file   Social Connections: Not on file   Intimate Partner Violence: Not on file   Housing Stability: Not on file      Family History:     Family History   Problem Relation Age of Onset    Diabetes Mother     Heart attack Mother     Heart disease Mother     Heart attack Father     Heart disease Father     Diabetes Father       Current Medications:     Current Outpatient Medications   Medication Sig Dispense Refill    metFORMIN (GLUCOPHAGE-XR) 500 mg 24 hr tablet Take 1 tablet (500 mg total) by mouth daily with dinner for 7 days, THEN 1 tablet (500 mg  "total) 2 (two) times a day with meals for 7 days, THEN 2 tablets (1,000 mg total) 2 (two) times a day with meals for 14 days. 77 tablet 0    pantoprazole (PROTONIX) 40 mg tablet Take 1 tablet (40 mg total) by mouth daily 30 tablet 0    atorvastatin (LIPITOR) 20 mg tablet Take 20 mg by mouth daily (Patient not taking: No sig reported)      Blood Glucose Monitoring Suppl (OneTouch Verio Reflect) w/Device KIT Check blood sugars once daily. Please substitute with appropriate alternative as covered by patient's insurance. Dx: E11.65 (Patient not taking: Reported on 5/6/2024) 1 kit 0    glucose blood (OneTouch Verio) test strip Check blood sugars once daily. Please substitute with appropriate alternative as covered by patient's insurance. Dx: E11.65 (Patient not taking: Reported on 5/6/2024) 100 each 3    nystatin-triamcinolone (MYCOLOG-II) ointment Apply topically 2 (two) times a day as needed (Patient not taking: No sig reported)      OneTouch Delica Lancets 33G MISC Check blood sugars once daily. Please substitute with appropriate alternative as covered by patient's insurance. Dx: E11.65 (Patient not taking: Reported on 5/6/2024) 100 each 3    rizatriptan (MAXALT) 10 MG tablet Take 1 tablet (10 mg total) by mouth once as needed for migraine May repeat in 2 hours if needed. Max 2/24 hours, 9/month. (Patient not taking: Reported on 5/6/2024) 9 tablet 6    Semaglutide,0.25 or 0.5MG/DOS, (Ozempic, 0.25 or 0.5 MG/DOSE,) 2 MG/1.5ML SOPN 0.25 mg weekly for 4 weeks and 0.5 mg weekly after (Patient not taking: Reported on 5/6/2024) 3 mL 0     No current facility-administered medications for this visit.      Allergies:     No Known Allergies   Physical Exam:     /90 (BP Location: Left arm, Patient Position: Sitting, Cuff Size: Large)   Pulse 102   Temp 99.7 °F (37.6 °C) (Tympanic)   Resp 16   Ht 5' 7.87\" (1.724 m)   Wt 129 kg (283 lb 9.6 oz)   SpO2 98%   BMI 43.29 kg/m²     Physical Exam  Vitals reviewed. "   Constitutional:       General: She is not in acute distress.     Appearance: Normal appearance. She is not ill-appearing.   HENT:      Head: Normocephalic and atraumatic.      Right Ear: Tympanic membrane normal.      Left Ear: Tympanic membrane normal.      Nose: No congestion.      Mouth/Throat:      Pharynx: No oropharyngeal exudate.   Eyes:      Extraocular Movements: Extraocular movements intact.   Cardiovascular:      Rate and Rhythm: Normal rate and regular rhythm.      Pulses: no weak pulses.           Dorsalis pedis pulses are 2+ on the right side and 2+ on the left side.      Heart sounds: No murmur heard.  Pulmonary:      Effort: Pulmonary effort is normal.   Abdominal:      General: There is no distension.      Palpations: Abdomen is soft. There is no mass.      Tenderness: There is no abdominal tenderness. There is no guarding or rebound.      Hernia: No hernia is present.   Musculoskeletal:      Right lower leg: No edema.      Left lower leg: Edema (trace ankle edema) present.   Feet:      Right foot:      Skin integrity: No ulcer, skin breakdown, erythema, warmth, callus or dry skin.      Left foot:      Skin integrity: No ulcer, skin breakdown, erythema, warmth, callus or dry skin.   Lymphadenopathy:      Cervical: Cervical adenopathy (left posterior cervical) present.   Skin:     Findings: Lesion present.             Comments: Ulcerated lesion 4 cm by 5 mc in size. See image    Neurological:      Mental Status: She is alert.   Psychiatric:         Mood and Affect: Mood normal.         Behavior: Behavior normal.         Thought Content: Thought content normal.          Mundo Worley MD   [facility]    Depression Screening Follow-up Plan: Patient's depression screening was positive with a PHQ-2 score of 3. Their PHQ-9 score was 8. Patient assessed for underlying major depression. They have no active suicidal ideations. Brief counseling provided and recommend additional  follow-up/re-evaluation next office visit.

## 2024-05-06 NOTE — PATIENT INSTRUCTIONS
Nice meeting you   May try inositol for PCOS   Also contact insurance regarding which GLP 1 agonist they would approve  Start metformin  Will f/u regarding leg rash  Check Bps at home   RTC in 3 months

## 2024-05-11 LAB
25(OH)D3+25(OH)D2 SERPL-MCNC: 12 NG/ML (ref 30–100)
ALBUMIN SERPL-MCNC: 4.1 G/DL (ref 3.5–5.7)
ALBUMIN/CREAT UR: 52
ALP SERPL-CCNC: 68 U/L (ref 35–120)
ALT SERPL-CCNC: 22 U/L
ANION GAP SERPL CALCULATED.3IONS-SCNC: 14 MMOL/L (ref 3–11)
AST SERPL-CCNC: 18 U/L
BASOPHILS # BLD AUTO: 0.1 THOU/CMM (ref 0–0.1)
BASOPHILS NFR BLD AUTO: 1 %
BILIRUB SERPL-MCNC: 0.5 MG/DL (ref 0.2–1)
BUN SERPL-MCNC: 9 MG/DL (ref 7–25)
CALCIUM SERPL-MCNC: 9.4 MG/DL (ref 8.5–10.1)
CHLORIDE SERPL-SCNC: 98 MMOL/L (ref 100–109)
CO2 SERPL-SCNC: 25 MMOL/L (ref 21–31)
CREAT SERPL-MCNC: 0.66 MG/DL (ref 0.4–1.1)
CREAT UR-MCNC: 238.5 MG/DL (ref 50–200)
CYTOLOGY CMNT CVX/VAG CYTO-IMP: ABNORMAL
DIFFERENTIAL METHOD BLD: ABNORMAL
EOSINOPHIL # BLD AUTO: 0.2 THOU/CMM (ref 0–0.5)
EOSINOPHIL NFR BLD AUTO: 2 %
ERYTHROCYTE [DISTWIDTH] IN BLOOD BY AUTOMATED COUNT: 14.4 % (ref 12–16)
FERRITIN SERPL-MCNC: 12 NG/ML (ref 11–306.8)
GFR/BSA.PRED SERPLBLD CYS-BASED-ARV: 118 ML/MIN/{1.73_M2}
GLUCOSE SERPL-MCNC: 252 MG/DL (ref 65–99)
HCT VFR BLD AUTO: 42.4 % (ref 35–43)
HGB BLD-MCNC: 14.1 G/DL (ref 11.5–14.5)
IRON SATN MFR SERPL: 12 % (ref 20–50)
IRON SERPL-MCNC: 54 UG/DL (ref 50–212)
LYMPHOCYTES # BLD AUTO: 3.5 THOU/CMM (ref 1–3)
LYMPHOCYTES NFR BLD AUTO: 34 %
MCH RBC QN AUTO: 24.2 PG (ref 26–34)
MCHC RBC AUTO-ENTMCNC: 33.2 G/DL (ref 32–37)
MCV RBC AUTO: 73 FL (ref 80–100)
MICROALBUMIN UR-MCNC: 12.4 MG/DL
MONOCYTES # BLD AUTO: 0.4 THOU/CMM (ref 0.3–1)
MONOCYTES NFR BLD AUTO: 4 %
NEUTROPHILS # BLD AUTO: 6.2 THOU/CMM (ref 1.8–7.8)
NEUTROPHILS NFR BLD AUTO: 59 %
PLATELET # BLD AUTO: 296 THOU/CMM (ref 140–350)
PMV BLD REES-ECKER: 8.7 FL (ref 7.5–11.3)
POTASSIUM SERPL-SCNC: 4 MMOL/L (ref 3.5–5.2)
PROT SERPL-MCNC: 6.7 G/DL (ref 6.3–8.3)
RBC # BLD AUTO: 5.83 MILL/CMM (ref 3.7–4.7)
SODIUM SERPL-SCNC: 137 MMOL/L (ref 135–145)
TIBC SERPL-MCNC: 444 UG/DL (ref 260–430)
TRANSFERRIN SERPL-MCNC: 317 MG/DL (ref 203–362)
TSH SERPL-ACNC: 1.15 UIU/ML (ref 0.45–5.33)
VIT B12 SERPL-MCNC: 307 PG/ML (ref 180–914)
WBC # BLD AUTO: 10.5 THOU/CMM (ref 4–10)

## 2024-05-16 DIAGNOSIS — E55.9 VITAMIN D DEFICIENCY: Primary | ICD-10-CM

## 2024-05-16 DIAGNOSIS — D50.9 IRON DEFICIENCY ANEMIA, UNSPECIFIED IRON DEFICIENCY ANEMIA TYPE: ICD-10-CM

## 2024-05-16 DIAGNOSIS — E87.29 HIGH ANION GAP METABOLIC ACIDOSIS: ICD-10-CM

## 2024-05-16 DIAGNOSIS — E53.8 B12 DEFICIENCY: ICD-10-CM

## 2024-05-16 RX ORDER — ERGOCALCIFEROL 1.25 MG/1
50000 CAPSULE ORAL WEEKLY
Qty: 8 CAPSULE | Refills: 0 | Status: SHIPPED | OUTPATIENT
Start: 2024-05-16 | End: 2024-07-05

## 2024-05-28 ENCOUNTER — APPOINTMENT (OUTPATIENT)
Dept: LAB | Facility: AMBULARY SURGERY CENTER | Age: 34
End: 2024-05-28
Payer: COMMERCIAL

## 2024-05-28 DIAGNOSIS — E87.29 HIGH ANION GAP METABOLIC ACIDOSIS: ICD-10-CM

## 2024-05-28 LAB
ALBUMIN SERPL BCP-MCNC: 3.9 G/DL (ref 3.5–5)
ALP SERPL-CCNC: 63 U/L (ref 34–104)
ALT SERPL W P-5'-P-CCNC: 22 U/L (ref 7–52)
ANION GAP SERPL CALCULATED.3IONS-SCNC: 13 MMOL/L (ref 4–13)
AST SERPL W P-5'-P-CCNC: 17 U/L (ref 13–39)
BILIRUB SERPL-MCNC: 0.37 MG/DL (ref 0.2–1)
BUN SERPL-MCNC: 8 MG/DL (ref 5–25)
CALCIUM SERPL-MCNC: 8.3 MG/DL (ref 8.4–10.2)
CHLORIDE SERPL-SCNC: 100 MMOL/L (ref 96–108)
CO2 SERPL-SCNC: 26 MMOL/L (ref 21–32)
CREAT SERPL-MCNC: 0.54 MG/DL (ref 0.6–1.3)
GFR SERPL CREATININE-BSD FRML MDRD: 123 ML/MIN/1.73SQ M
GLUCOSE P FAST SERPL-MCNC: 221 MG/DL (ref 65–99)
POTASSIUM SERPL-SCNC: 3.8 MMOL/L (ref 3.5–5.3)
PROT SERPL-MCNC: 6.3 G/DL (ref 6.4–8.4)
SODIUM SERPL-SCNC: 139 MMOL/L (ref 135–147)

## 2024-05-28 PROCEDURE — 80053 COMPREHEN METABOLIC PANEL: CPT

## 2024-05-28 PROCEDURE — 36415 COLL VENOUS BLD VENIPUNCTURE: CPT

## 2024-05-31 ENCOUNTER — TELEPHONE (OUTPATIENT)
Dept: FAMILY MEDICINE CLINIC | Facility: CLINIC | Age: 34
End: 2024-05-31

## 2024-05-31 NOTE — TELEPHONE ENCOUNTER
----- Message from Yesica GAN sent at 5/29/2024 12:11 PM EDT -----  Patient is aware of results, she notes she did not know to check her blood sugars and is not sure if they have been elevated lately. Please advise.

## 2024-06-03 ENCOUNTER — TELEPHONE (OUTPATIENT)
Dept: FAMILY MEDICINE CLINIC | Facility: CLINIC | Age: 34
End: 2024-06-03

## 2024-06-03 NOTE — TELEPHONE ENCOUNTER
The patient called she was returning Dr Worley's call she can be reached at [phone]   thank you

## 2024-06-07 DIAGNOSIS — E11.65 TYPE 2 DIABETES MELLITUS WITH HYPERGLYCEMIA, WITHOUT LONG-TERM CURRENT USE OF INSULIN (HCC): ICD-10-CM

## 2024-06-07 DIAGNOSIS — E28.2 PCOS (POLYCYSTIC OVARIAN SYNDROME): ICD-10-CM

## 2024-06-07 RX ORDER — METFORMIN HYDROCHLORIDE 500 MG/1
TABLET, EXTENDED RELEASE ORAL
Qty: 77 TABLET | Refills: 0 | Status: SHIPPED | OUTPATIENT
Start: 2024-06-07 | End: 2024-07-04

## 2024-06-07 NOTE — TELEPHONE ENCOUNTER
Requested medication(s) are due for refill today: Yes  Patient has already received a courtesy refill: No  Other reason request has been forwarded to provider: PER PROTOCOL please advise the sig.

## 2024-06-12 DIAGNOSIS — R10.13 DYSPEPSIA: ICD-10-CM

## 2024-06-12 RX ORDER — PANTOPRAZOLE SODIUM 40 MG/1
40 TABLET, DELAYED RELEASE ORAL DAILY
Qty: 30 TABLET | Refills: 5 | Status: SHIPPED | OUTPATIENT
Start: 2024-06-12

## 2024-06-15 DIAGNOSIS — Z00.6 ENCOUNTER FOR EXAMINATION FOR NORMAL COMPARISON OR CONTROL IN CLINICAL RESEARCH PROGRAM: ICD-10-CM

## 2024-07-12 DIAGNOSIS — E11.65 TYPE 2 DIABETES MELLITUS WITH HYPERGLYCEMIA, WITHOUT LONG-TERM CURRENT USE OF INSULIN (HCC): ICD-10-CM

## 2024-07-12 DIAGNOSIS — E28.2 PCOS (POLYCYSTIC OVARIAN SYNDROME): ICD-10-CM

## 2024-07-12 RX ORDER — METFORMIN HYDROCHLORIDE 500 MG/1
TABLET, EXTENDED RELEASE ORAL
Qty: 77 TABLET | Refills: 5 | Status: SHIPPED | OUTPATIENT
Start: 2024-07-12

## 2024-08-06 ENCOUNTER — APPOINTMENT (OUTPATIENT)
Dept: LAB | Facility: AMBULARY SURGERY CENTER | Age: 34
End: 2024-08-06
Payer: COMMERCIAL

## 2024-08-06 DIAGNOSIS — Z00.6 ENCOUNTER FOR EXAMINATION FOR NORMAL COMPARISON OR CONTROL IN CLINICAL RESEARCH PROGRAM: ICD-10-CM

## 2024-08-06 DIAGNOSIS — E55.9 VITAMIN D DEFICIENCY: ICD-10-CM

## 2024-08-06 DIAGNOSIS — E11.65 TYPE 2 DIABETES MELLITUS WITH HYPERGLYCEMIA, WITHOUT LONG-TERM CURRENT USE OF INSULIN (HCC): ICD-10-CM

## 2024-08-06 DIAGNOSIS — R53.83 FATIGUE, UNSPECIFIED TYPE: ICD-10-CM

## 2024-08-06 DIAGNOSIS — D50.9 IRON DEFICIENCY ANEMIA, UNSPECIFIED IRON DEFICIENCY ANEMIA TYPE: ICD-10-CM

## 2024-08-06 DIAGNOSIS — G47.33 OSA (OBSTRUCTIVE SLEEP APNEA): ICD-10-CM

## 2024-08-06 DIAGNOSIS — E53.8 B12 DEFICIENCY: ICD-10-CM

## 2024-08-06 DIAGNOSIS — E87.29 HIGH ANION GAP METABOLIC ACIDOSIS: ICD-10-CM

## 2024-08-06 LAB
25(OH)D3 SERPL-MCNC: 30.8 NG/ML (ref 30–100)
ALBUMIN SERPL BCG-MCNC: 3.8 G/DL (ref 3.5–5)
ALP SERPL-CCNC: 61 U/L (ref 34–104)
ALT SERPL W P-5'-P-CCNC: 15 U/L (ref 7–52)
ANION GAP SERPL CALCULATED.3IONS-SCNC: 10 MMOL/L (ref 4–13)
AST SERPL W P-5'-P-CCNC: 13 U/L (ref 13–39)
B-OH-BUTYR SERPL-MCNC: 0.08 MMOL/L (ref 0.02–0.27)
BASOPHILS # BLD AUTO: 0.06 THOUSANDS/ÂΜL (ref 0–0.1)
BASOPHILS NFR BLD AUTO: 1 % (ref 0–1)
BILIRUB SERPL-MCNC: 0.27 MG/DL (ref 0.2–1)
BUN SERPL-MCNC: 10 MG/DL (ref 5–25)
CALCIUM SERPL-MCNC: 8.7 MG/DL (ref 8.4–10.2)
CHLORIDE SERPL-SCNC: 102 MMOL/L (ref 96–108)
CO2 SERPL-SCNC: 25 MMOL/L (ref 21–32)
CREAT SERPL-MCNC: 0.63 MG/DL (ref 0.6–1.3)
CREAT UR-MCNC: 61.8 MG/DL
EOSINOPHIL # BLD AUTO: 0.36 THOUSAND/ÂΜL (ref 0–0.61)
EOSINOPHIL NFR BLD AUTO: 3 % (ref 0–6)
ERYTHROCYTE [DISTWIDTH] IN BLOOD BY AUTOMATED COUNT: 14.1 % (ref 11.6–15.1)
FERRITIN SERPL-MCNC: 13 NG/ML (ref 11–307)
GFR SERPL CREATININE-BSD FRML MDRD: 117 ML/MIN/1.73SQ M
GLUCOSE P FAST SERPL-MCNC: 207 MG/DL (ref 65–99)
HCT VFR BLD AUTO: 38.9 % (ref 34.8–46.1)
HGB BLD-MCNC: 12.3 G/DL (ref 11.5–15.4)
IMM GRANULOCYTES # BLD AUTO: 0.1 THOUSAND/UL (ref 0–0.2)
IMM GRANULOCYTES NFR BLD AUTO: 1 % (ref 0–2)
IRON SATN MFR SERPL: 9 % (ref 15–50)
IRON SERPL-MCNC: 37 UG/DL (ref 50–212)
LYMPHOCYTES # BLD AUTO: 3.4 THOUSANDS/ÂΜL (ref 0.6–4.47)
LYMPHOCYTES NFR BLD AUTO: 31 % (ref 14–44)
MCH RBC QN AUTO: 25.5 PG (ref 26.8–34.3)
MCHC RBC AUTO-ENTMCNC: 31.6 G/DL (ref 31.4–37.4)
MCV RBC AUTO: 81 FL (ref 82–98)
MICROALBUMIN UR-MCNC: 20.3 MG/L
MICROALBUMIN/CREAT 24H UR: 33 MG/G CREATININE (ref 0–30)
MONOCYTES # BLD AUTO: 0.47 THOUSAND/ÂΜL (ref 0.17–1.22)
MONOCYTES NFR BLD AUTO: 4 % (ref 4–12)
NEUTROPHILS # BLD AUTO: 6.55 THOUSANDS/ÂΜL (ref 1.85–7.62)
NEUTS SEG NFR BLD AUTO: 60 % (ref 43–75)
NRBC BLD AUTO-RTO: 0 /100 WBCS
PLATELET # BLD AUTO: 407 THOUSANDS/UL (ref 149–390)
PMV BLD AUTO: 10.3 FL (ref 8.9–12.7)
POTASSIUM SERPL-SCNC: 4.1 MMOL/L (ref 3.5–5.3)
PROT SERPL-MCNC: 6.3 G/DL (ref 6.4–8.4)
RBC # BLD AUTO: 4.82 MILLION/UL (ref 3.81–5.12)
SODIUM SERPL-SCNC: 137 MMOL/L (ref 135–147)
TIBC SERPL-MCNC: 397 UG/DL (ref 250–450)
TSH SERPL DL<=0.05 MIU/L-ACNC: 0.83 UIU/ML (ref 0.45–4.5)
UIBC SERPL-MCNC: 360 UG/DL (ref 155–355)
VIT B12 SERPL-MCNC: 213 PG/ML (ref 180–914)
WBC # BLD AUTO: 10.94 THOUSAND/UL (ref 4.31–10.16)

## 2024-08-06 PROCEDURE — 82728 ASSAY OF FERRITIN: CPT

## 2024-08-06 PROCEDURE — 83550 IRON BINDING TEST: CPT

## 2024-08-06 PROCEDURE — 82010 KETONE BODYS QUAN: CPT

## 2024-08-06 PROCEDURE — 82607 VITAMIN B-12: CPT

## 2024-08-06 PROCEDURE — 82306 VITAMIN D 25 HYDROXY: CPT

## 2024-08-06 PROCEDURE — 80053 COMPREHEN METABOLIC PANEL: CPT

## 2024-08-06 PROCEDURE — 83540 ASSAY OF IRON: CPT

## 2024-08-06 PROCEDURE — 85025 COMPLETE CBC W/AUTO DIFF WBC: CPT

## 2024-08-06 PROCEDURE — 36415 COLL VENOUS BLD VENIPUNCTURE: CPT

## 2024-08-06 PROCEDURE — 82043 UR ALBUMIN QUANTITATIVE: CPT

## 2024-08-06 PROCEDURE — 84443 ASSAY THYROID STIM HORMONE: CPT

## 2024-08-06 PROCEDURE — 82570 ASSAY OF URINE CREATININE: CPT

## 2024-08-08 ENCOUNTER — OFFICE VISIT (OUTPATIENT)
Dept: FAMILY MEDICINE CLINIC | Facility: CLINIC | Age: 34
End: 2024-08-08
Payer: COMMERCIAL

## 2024-08-08 VITALS
RESPIRATION RATE: 16 BRPM | WEIGHT: 280 LBS | OXYGEN SATURATION: 99 % | SYSTOLIC BLOOD PRESSURE: 132 MMHG | BODY MASS INDEX: 43.95 KG/M2 | HEIGHT: 67 IN | DIASTOLIC BLOOD PRESSURE: 74 MMHG | HEART RATE: 102 BPM

## 2024-08-08 DIAGNOSIS — D50.9 IRON DEFICIENCY ANEMIA, UNSPECIFIED IRON DEFICIENCY ANEMIA TYPE: ICD-10-CM

## 2024-08-08 DIAGNOSIS — E11.65 TYPE 2 DIABETES MELLITUS WITH HYPERGLYCEMIA, WITHOUT LONG-TERM CURRENT USE OF INSULIN (HCC): Primary | ICD-10-CM

## 2024-08-08 DIAGNOSIS — F43.21 SITUATIONAL DEPRESSION: ICD-10-CM

## 2024-08-08 DIAGNOSIS — G43.009 MIGRAINE WITHOUT AURA AND WITHOUT STATUS MIGRAINOSUS, NOT INTRACTABLE: ICD-10-CM

## 2024-08-08 DIAGNOSIS — M54.2 NECK PAIN: ICD-10-CM

## 2024-08-08 DIAGNOSIS — E55.9 VITAMIN D DEFICIENCY: ICD-10-CM

## 2024-08-08 DIAGNOSIS — H93.A2 PULSATILE TINNITUS OF LEFT EAR: ICD-10-CM

## 2024-08-08 LAB — SL AMB POCT HEMOGLOBIN AIC: 8.6 (ref ?–6.5)

## 2024-08-08 PROCEDURE — 99214 OFFICE O/P EST MOD 30 MIN: CPT | Performed by: FAMILY MEDICINE

## 2024-08-08 PROCEDURE — 83036 HEMOGLOBIN GLYCOSYLATED A1C: CPT | Performed by: FAMILY MEDICINE

## 2024-08-08 RX ORDER — BUPROPION HYDROCHLORIDE 150 MG/1
150 TABLET ORAL EVERY MORNING
Qty: 30 TABLET | Refills: 5 | Status: SHIPPED | OUTPATIENT
Start: 2024-08-08 | End: 2025-02-04

## 2024-08-08 NOTE — PROGRESS NOTES
Ambulatory Visit  Name: Sari Hernandez      : 1990      MRN: [id number]  Encounter Provider: Mundo Worley MD  Encounter Date: 2024   Encounter department: [facility]    Assessment & Plan   1. Type 2 diabetes mellitus with hyperglycemia, without long-term current use of insulin (HCC)  Comments:  Improved to 8.6 down from 11.2. Shared she has cut out the soda and snacks. Keep up the hard work. Will maintain in meformin 1000 mg BID  Orders:  -     POCT hemoglobin A1c  2. Situational depression  Comments:  Has dealt with depression off and on for years. Mother's recent admission has made it harer to manage on her own. Dicussed meds and therapy. Agreed to both. Start Wellbutrin 150 mg in am.   Orders:  -     buPROPion (WELLBUTRIN XL) 150 mg 24 hr tablet; Take 1 tablet (150 mg total) by mouth every morning  -     Ambulatory referral to Psych Services; Future  3. Vitamin D deficiency  Comments:  Completed high doses and now started OTC supplements  4. Iron deficiency anemia, unspecified iron deficiency anemia type  Comments:  Pt reminded to take 325 mg of iron daily with food  5. Pulsatile tinnitus of left ear  Comments:  New with headaches and neck pain. Possibly stressed induce but will rule out vascular etiologies with CTA head and neck  Orders:  -     CTA head and neck w wo contrast; Future; Expected date: 2024  6. Migraine without aura and without status migrainosus, not intractable  -     CTA head and neck w wo contrast; Future; Expected date: 2024  7. Neck pain  Comments:  Left neck tenderness. Believe its stress induced. Apply heat the the shoulder. Will get CT to further investigate  Orders:  -     CTA head and neck w wo contrast; Future; Expected date: 2024       History of Present Illness     Here for follow up   Dealing with a lot of stress in regards to her mother who was admitted for osteomyelitis and not in rehab the last 2 months  Feeling a lot of  pressure in the head, left sided neck pain, pulsatile tinnitus, and dizziness  Also mentioned occasional palpitations that feel like flutters  She has had depression for years but is becoming difficult to manage  Is interested in therapy and medication   Headaches have recently started and is different from her usual migraines    No hearing loss. Occasional nausea. Occasional tingling noted in both hands        Review of Systems  Medical History Reviewed by provider this encounter:       Past Medical History   Past Medical History:   Diagnosis Date    Anxiety     Depression     Hypertension 2021    Kidney stone 2016    Migraine     Obesity     PCOS (polycystic ovarian syndrome)      Past Surgical History:   Procedure Laterality Date    CHOLECYSTECTOMY      WISDOM TOOTH EXTRACTION       Family History   Problem Relation Age of Onset    Diabetes Mother     Heart attack Mother     Heart disease Mother     Heart attack Father     Heart disease Father     Diabetes Father      Current Outpatient Medications on File Prior to Visit   Medication Sig Dispense Refill    metFORMIN (GLUCOPHAGE-XR) 500 mg 24 hr tablet TAKE 1 TABLET BY MOUTH DAILY WITH DINNER X 7DAYS, THEN 1 TABLET 2 TIMES A DAY WITH MEALS FOR 7 DAYS. THEN 2 TABLETS 2 TIMES A DAY WITH MEALS FOR 14 DAYS. 77 tablet 5    pantoprazole (PROTONIX) 40 mg tablet TAKE 1 TABLET BY MOUTH EVERY DAY 30 tablet 5    Blood Glucose Monitoring Suppl (OneTouch Verio Reflect) w/Device KIT Check blood sugars once daily. Please substitute with appropriate alternative as covered by patient's insurance. Dx: E11.65 (Patient not taking: Reported on 5/6/2024) 1 kit 0    ergocalciferol (VITAMIN D2) 50,000 units Take 1 capsule (50,000 Units total) by mouth once a week for 8 doses (Patient not taking: Reported on 8/8/2024) 8 capsule 0    glucose blood (OneTouch Verio) test strip Check blood sugars once daily. Please substitute with appropriate alternative as covered by patient's insurance. Dx:  E11.65 (Patient not taking: Reported on 5/6/2024) 100 each 3    OneTouch Delica Lancets 33G MISC Check blood sugars once daily. Please substitute with appropriate alternative as covered by patient's insurance. Dx: E11.65 (Patient not taking: Reported on 5/6/2024) 100 each 3    rizatriptan (MAXALT) 10 MG tablet Take 1 tablet (10 mg total) by mouth once as needed for migraine May repeat in 2 hours if needed. Max 2/24 hours, 9/month. (Patient not taking: Reported on 5/6/2024) 9 tablet 6    [DISCONTINUED] atorvastatin (LIPITOR) 20 mg tablet Take 20 mg by mouth daily (Patient not taking: Reported on 3/2/2022)      [DISCONTINUED] nystatin-triamcinolone (MYCOLOG-II) ointment Apply topically 2 (two) times a day as needed (Patient not taking: Reported on 11/3/2022)      [DISCONTINUED] Semaglutide,0.25 or 0.5MG/DOS, (Ozempic, 0.25 or 0.5 MG/DOSE,) 2 MG/1.5ML SOPN 0.25 mg weekly for 4 weeks and 0.5 mg weekly after (Patient not taking: Reported on 5/6/2024) 3 mL 0     No current facility-administered medications on file prior to visit.   No Known Allergies   Current Outpatient Medications on File Prior to Visit   Medication Sig Dispense Refill    metFORMIN (GLUCOPHAGE-XR) 500 mg 24 hr tablet TAKE 1 TABLET BY MOUTH DAILY WITH DINNER X 7DAYS, THEN 1 TABLET 2 TIMES A DAY WITH MEALS FOR 7 DAYS. THEN 2 TABLETS 2 TIMES A DAY WITH MEALS FOR 14 DAYS. 77 tablet 5    pantoprazole (PROTONIX) 40 mg tablet TAKE 1 TABLET BY MOUTH EVERY DAY 30 tablet 5    Blood Glucose Monitoring Suppl (OneTouch Verio Reflect) w/Device KIT Check blood sugars once daily. Please substitute with appropriate alternative as covered by patient's insurance. Dx: E11.65 (Patient not taking: Reported on 5/6/2024) 1 kit 0    ergocalciferol (VITAMIN D2) 50,000 units Take 1 capsule (50,000 Units total) by mouth once a week for 8 doses (Patient not taking: Reported on 8/8/2024) 8 capsule 0    glucose blood (OneTouch Verio) test strip Check blood sugars once daily. Please  "substitute with appropriate alternative as covered by patient's insurance. Dx: E11.65 (Patient not taking: Reported on 5/6/2024) 100 each 3    OneTouch Delica Lancets 33G MISC Check blood sugars once daily. Please substitute with appropriate alternative as covered by patient's insurance. Dx: E11.65 (Patient not taking: Reported on 5/6/2024) 100 each 3    rizatriptan (MAXALT) 10 MG tablet Take 1 tablet (10 mg total) by mouth once as needed for migraine May repeat in 2 hours if needed. Max 2/24 hours, 9/month. (Patient not taking: Reported on 5/6/2024) 9 tablet 6    [DISCONTINUED] atorvastatin (LIPITOR) 20 mg tablet Take 20 mg by mouth daily (Patient not taking: Reported on 3/2/2022)      [DISCONTINUED] nystatin-triamcinolone (MYCOLOG-II) ointment Apply topically 2 (two) times a day as needed (Patient not taking: Reported on 11/3/2022)      [DISCONTINUED] Semaglutide,0.25 or 0.5MG/DOS, (Ozempic, 0.25 or 0.5 MG/DOSE,) 2 MG/1.5ML SOPN 0.25 mg weekly for 4 weeks and 0.5 mg weekly after (Patient not taking: Reported on 5/6/2024) 3 mL 0     No current facility-administered medications on file prior to visit.      Social History     Tobacco Use    Smoking status: Never    Smokeless tobacco: Never   Vaping Use    Vaping status: Never Used   Substance and Sexual Activity    Alcohol use: Never    Drug use: Never    Sexual activity: Not Currently     Partners: Male     Birth control/protection: Condom Male     Objective     /74 (BP Location: Left arm, Patient Position: Sitting, Cuff Size: Standard)   Pulse 102   Resp 16   Ht 5' 7\" (1.702 m)   Wt 127 kg (280 lb)   SpO2 99%   BMI 43.85 kg/m²     Physical Exam  Constitutional:       General: She is not in acute distress.     Appearance: Normal appearance. She is not ill-appearing.   HENT:      Head: Normocephalic and atraumatic.   Eyes:      General: No visual field deficit.     Extraocular Movements: Extraocular movements intact.      Pupils: Pupils are equal, round, " and reactive to light.   Cardiovascular:      Rate and Rhythm: Normal rate and regular rhythm.      Heart sounds: No murmur heard.  Pulmonary:      Effort: Pulmonary effort is normal.      Breath sounds: Normal breath sounds.   Musculoskeletal:      Right lower leg: No edema.      Left lower leg: No edema.   Skin:     General: Skin is warm.      Findings: Lesion (hyperpigmented lesion onthe right shin and left LE) present.   Neurological:      General: No focal deficit present.      Mental Status: She is alert and oriented to person, place, and time. Mental status is at baseline.      Cranial Nerves: No dysarthria or facial asymmetry.      Sensory: No sensory deficit.      Motor: Motor function is intact.      Coordination: Romberg sign negative. Finger-Nose-Finger Test normal.      Gait: Gait is intact.   Psychiatric:         Mood and Affect: Mood normal.         Behavior: Behavior normal.         Thought Content: Thought content normal.       Administrative Statements   I have spent a total time of 35 minutes in caring for this patient on the day of the visit/encounter including Risks and benefits of tx options, Instructions for management, Patient and family education, Importance of tx compliance, Risk factor reductions, Impressions, Counseling / Coordination of care, Documenting in the medical record, Reviewing / ordering tests, medicine, procedures  , and Obtaining or reviewing history  .

## 2024-08-09 ENCOUNTER — TELEPHONE (OUTPATIENT)
Age: 34
End: 2024-08-09

## 2024-08-09 NOTE — TELEPHONE ENCOUNTER
Contacted Pt. in regards to ROUTINE Referral, LVM to contact [phone] to discuss services needed at this time in order to be added to proper wait list.

## 2024-08-16 ENCOUNTER — TELEPHONE (OUTPATIENT)
Age: 34
End: 2024-08-16

## 2024-08-16 NOTE — TELEPHONE ENCOUNTER
Contacted pt. In regards to Routine Referral. Pt. Added to Wait List for Talk Therapy. Pt. Specified In Person only.

## 2024-08-22 LAB
APOB+LDLR+PCSK9 GENE MUT ANL BLD/T: NOT DETECTED
BRCA1+BRCA2 DEL+DUP + FULL MUT ANL BLD/T: NOT DETECTED
MLH1+MSH2+MSH6+PMS2 GN DEL+DUP+FUL M: NOT DETECTED

## 2024-08-31 DIAGNOSIS — F43.21 SITUATIONAL DEPRESSION: ICD-10-CM

## 2024-09-02 RX ORDER — BUPROPION HYDROCHLORIDE 150 MG/1
150 TABLET ORAL EVERY MORNING
Qty: 90 TABLET | Refills: 1 | Status: SHIPPED | OUTPATIENT
Start: 2024-09-02

## 2024-11-12 ENCOUNTER — OFFICE VISIT (OUTPATIENT)
Dept: FAMILY MEDICINE CLINIC | Facility: CLINIC | Age: 34
End: 2024-11-12
Payer: COMMERCIAL

## 2024-11-12 VITALS
DIASTOLIC BLOOD PRESSURE: 70 MMHG | TEMPERATURE: 98.9 F | SYSTOLIC BLOOD PRESSURE: 141 MMHG | BODY MASS INDEX: 45.27 KG/M2 | OXYGEN SATURATION: 95 % | WEIGHT: 288.4 LBS | RESPIRATION RATE: 16 BRPM | HEART RATE: 87 BPM | HEIGHT: 67 IN

## 2024-11-12 DIAGNOSIS — E11.65 TYPE 2 DIABETES MELLITUS WITH HYPERGLYCEMIA, WITHOUT LONG-TERM CURRENT USE OF INSULIN (HCC): Primary | ICD-10-CM

## 2024-11-12 DIAGNOSIS — R09.82 PND (POST-NASAL DRIP): ICD-10-CM

## 2024-11-12 DIAGNOSIS — R10.13 DYSPEPSIA: ICD-10-CM

## 2024-11-12 DIAGNOSIS — R03.0 ELEVATED BLOOD PRESSURE READING: ICD-10-CM

## 2024-11-12 LAB
LEFT EYE DIABETIC RETINOPATHY: NORMAL
LEFT EYE IMAGE QUALITY: NORMAL
LEFT EYE MACULAR EDEMA: NORMAL
LEFT EYE OTHER RETINOPATHY: NORMAL
RIGHT EYE DIABETIC RETINOPATHY: NORMAL
RIGHT EYE IMAGE QUALITY: NORMAL
RIGHT EYE MACULAR EDEMA: NORMAL
RIGHT EYE OTHER RETINOPATHY: NORMAL
SEVERITY (EYE EXAM): NORMAL
SL AMB POCT HEMOGLOBIN AIC: 10.5 (ref ?–6.5)

## 2024-11-12 PROCEDURE — 92250 FUNDUS PHOTOGRAPHY W/I&R: CPT | Performed by: FAMILY MEDICINE

## 2024-11-12 PROCEDURE — 99214 OFFICE O/P EST MOD 30 MIN: CPT | Performed by: FAMILY MEDICINE

## 2024-11-12 PROCEDURE — 83036 HEMOGLOBIN GLYCOSYLATED A1C: CPT | Performed by: FAMILY MEDICINE

## 2024-11-12 RX ORDER — FLUTICASONE PROPIONATE 50 MCG
1 SPRAY, SUSPENSION (ML) NASAL DAILY
Qty: 9.9 ML | Refills: 0 | Status: SHIPPED | OUTPATIENT
Start: 2024-11-12 | End: 2024-11-12

## 2024-11-12 RX ORDER — FLUTICASONE PROPIONATE 50 MCG
1 SPRAY, SUSPENSION (ML) NASAL DAILY
Start: 2024-11-12

## 2024-11-12 RX ORDER — PANTOPRAZOLE SODIUM 40 MG/1
40 TABLET, DELAYED RELEASE ORAL DAILY
Qty: 30 TABLET | Refills: 1 | Status: SHIPPED | OUTPATIENT
Start: 2024-11-12

## 2024-11-12 NOTE — PROGRESS NOTES
Ambulatory Visit  Name: Sari Hernandez      : 1990      MRN: [id number]  Encounter Provider: Mundo Worley MD  Encounter Date: 2024   [facility]  Check BP and call with readings in 1 week  MRI of the neck, if bP nromal  Assessment & Plan  Type 2 diabetes mellitus with hyperglycemia, without long-term current use of insulin (HCC)    Lab Results   Component Value Date    HGBA1C 10.5 (A) 2024     Up from 8.6. Counseled on diet and incorporating exercise. Discussed medical management, preferably insulin given the A1c. Pt prefers not to be on a injectable mainly due to cost. Agreed to start Jardiance 10 mg. Diabetic eye exam performed and referred to clincal pharmacy to help manage DM     Orders:    POCT hemoglobin A1c    IRIS Diabetic eye exam    Empagliflozin (Jardiance) 10 MG TABS tablet; Take 1 tablet (10 mg total) by mouth daily    Ambulatory referral to Clinical Pharmacy; Future    Elevated blood pressure reading  Measured 141/70  Asked to monitor pressures at home and call with readings  May be causing lightheadedness   Goal is less than 130/80       PND (post-nasal drip)    Orders:    fluticasone (FLONASE) 50 mcg/act nasal spray; 1 spray into each nostril daily    Dyspepsia  Chronic epigastric pain and bloating. Will try Protonix for 4 weeks. Avoid spicy and acidic foods. Also limit caffeine. If symptoms do not improve, may see GI   Orders:    Ambulatory Referral to Gastroenterology; Future    pantoprazole (PROTONIX) 40 mg tablet; Take 1 tablet (40 mg total) by mouth daily       History of Present Illness     Dealing with a lot of stress  Mom is in  SNF and calling her throughout the day   Hasn't been able to see therapist   Did reach out to [facility]. Also contacted insurance regarding therapy  On a waiting list with [facility]   Feels off balanced and is experiencing lightheadedness.   Head feels foggy  Occurs when sitting or standing  Feet feel  "numb  Still having neck pain but the tinnitus has resolved   Neck pain radiates down the left arm but has had pain in both hands in the past  Since September she's had PND  Has gotten better  and is taking allergy medication   Also reports early satiety, nausea, and bloating  S.p [facility] surgery  Appt with sleep medicine r/s from next week to Dec          Review of Systems   Constitutional:  Positive for appetite change. Negative for unexpected weight change.   HENT:  Positive for postnasal drip.    Respiratory: Negative.     Cardiovascular: Negative.    Gastrointestinal:  Positive for abdominal distention. Negative for blood in stool, constipation, diarrhea and vomiting.   Musculoskeletal:  Positive for neck pain and neck stiffness.   Neurological:  Positive for dizziness, light-headedness and numbness.   Psychiatric/Behavioral:  Positive for decreased concentration. The patient is nervous/anxious.            Objective     /70 (BP Location: Left arm, Patient Position: Sitting, Cuff Size: Large)   Pulse 87   Temp 98.9 °F (37.2 °C) (Tympanic)   Resp 16   Ht 5' 7\" (1.702 m)   Wt 131 kg (288 lb 6.4 oz)   SpO2 95%   BMI 45.17 kg/m²     Physical Exam  Vitals reviewed.   Constitutional:       General: She is not in acute distress.     Appearance: Normal appearance. She is not ill-appearing or toxic-appearing.   HENT:      Head: Normocephalic and atraumatic.   Cardiovascular:      Rate and Rhythm: Normal rate and regular rhythm.      Heart sounds: No murmur heard.  Pulmonary:      Effort: Pulmonary effort is normal. No respiratory distress.      Breath sounds: Normal breath sounds. No stridor. No wheezing, rhonchi or rales.   Musculoskeletal:      Right lower leg: No edema.      Left lower leg: No edema.   Skin:     General: Skin is warm.   Neurological:      Mental Status: She is alert and oriented to person, place, and time.         "

## 2024-11-12 NOTE — ASSESSMENT & PLAN NOTE
Lab Results   Component Value Date    HGBA1C 10.5 (A) 11/12/2024     Up from 8.6. Counseled on diet and incorporating exercise. Discussed medical management, preferably insulin given the A1c. Pt prefers not to be on a injectable mainly due to cost. Agreed to start Jardiance 10 mg. Diabetic eye exam performed and referred to clincal pharmacy to help manage DM     Orders:    POCT hemoglobin A1c    IRIS Diabetic eye exam    Empagliflozin (Jardiance) 10 MG TABS tablet; Take 1 tablet (10 mg total) by mouth daily    Ambulatory referral to Clinical Pharmacy; Future

## 2024-11-15 ENCOUNTER — RESULTS FOLLOW-UP (OUTPATIENT)
Dept: FAMILY MEDICINE CLINIC | Facility: CLINIC | Age: 34
End: 2024-11-15

## 2024-11-22 ENCOUNTER — TELEPHONE (OUTPATIENT)
Dept: INTERNAL MEDICINE CLINIC | Facility: CLINIC | Age: 34
End: 2024-11-22

## 2024-11-22 NOTE — TELEPHONE ENCOUNTER
Please contact patient to schedule Clinical Pharmacist Appointment     Reason for appointment: diabetes management  When to schedule with Pharmacist: next several weeks  What should the patient bring to the appointment: blood sugars    Appointment Department:  [facility]  Pharmacist patient will be seeing: Mariel Lee  Visit Type Preference (ie Phone, Video, In-person): virtual ok, or in person - up to patient  [facility] at:  [facility]  Virtual visits will be completed via [employer] or Phone - please clarify patient preference in appointment notes    Please respond to this note to keep track of the number of patient outreaches.     Please try to reach out to patient on 2 separate days

## 2024-11-26 DIAGNOSIS — R10.13 DYSPEPSIA: ICD-10-CM

## 2024-11-27 RX ORDER — PANTOPRAZOLE SODIUM 40 MG/1
40 TABLET, DELAYED RELEASE ORAL DAILY
Qty: 90 TABLET | Refills: 0 | Status: SHIPPED | OUTPATIENT
Start: 2024-11-27

## 2025-03-31 ENCOUNTER — OFFICE VISIT (OUTPATIENT)
Dept: FAMILY MEDICINE CLINIC | Facility: CLINIC | Age: 35
End: 2025-03-31
Payer: COMMERCIAL

## 2025-03-31 VITALS
HEIGHT: 67 IN | WEIGHT: 279 LBS | RESPIRATION RATE: 17 BRPM | BODY MASS INDEX: 43.79 KG/M2 | DIASTOLIC BLOOD PRESSURE: 80 MMHG | HEART RATE: 96 BPM | OXYGEN SATURATION: 98 % | SYSTOLIC BLOOD PRESSURE: 128 MMHG | TEMPERATURE: 99.7 F

## 2025-03-31 DIAGNOSIS — L91.8 SKIN TAG: ICD-10-CM

## 2025-03-31 DIAGNOSIS — E55.9 VITAMIN D DEFICIENCY: ICD-10-CM

## 2025-03-31 DIAGNOSIS — E11.620 NECROBIOSIS DIABETICORUM (HCC): ICD-10-CM

## 2025-03-31 DIAGNOSIS — R20.2 PARESTHESIA OF BOTH LOWER EXTREMITIES: ICD-10-CM

## 2025-03-31 DIAGNOSIS — H01.131 ECZEMATOUS DERMATITIS OF UPPER EYELIDS OF BOTH EYES: ICD-10-CM

## 2025-03-31 DIAGNOSIS — G47.62 NOCTURNAL LEG CRAMPS: ICD-10-CM

## 2025-03-31 DIAGNOSIS — F32.2 SEVERE MAJOR DEPRESSIVE DISORDER (HCC): ICD-10-CM

## 2025-03-31 DIAGNOSIS — R03.0 ELEVATED BLOOD PRESSURE READING: ICD-10-CM

## 2025-03-31 DIAGNOSIS — H01.134 ECZEMATOUS DERMATITIS OF UPPER EYELIDS OF BOTH EYES: ICD-10-CM

## 2025-03-31 DIAGNOSIS — D50.9 IRON DEFICIENCY ANEMIA, UNSPECIFIED IRON DEFICIENCY ANEMIA TYPE: ICD-10-CM

## 2025-03-31 DIAGNOSIS — F43.21 SITUATIONAL DEPRESSION: ICD-10-CM

## 2025-03-31 DIAGNOSIS — E11.65 TYPE 2 DIABETES MELLITUS WITH HYPERGLYCEMIA, WITHOUT LONG-TERM CURRENT USE OF INSULIN (HCC): Primary | ICD-10-CM

## 2025-03-31 DIAGNOSIS — E53.8 B12 DEFICIENCY: ICD-10-CM

## 2025-03-31 LAB — SL AMB POCT HEMOGLOBIN AIC: 10.6 (ref ?–6.5)

## 2025-03-31 PROCEDURE — 83036 HEMOGLOBIN GLYCOSYLATED A1C: CPT | Performed by: FAMILY MEDICINE

## 2025-03-31 PROCEDURE — 99214 OFFICE O/P EST MOD 30 MIN: CPT | Performed by: FAMILY MEDICINE

## 2025-03-31 RX ORDER — HYDROCORTISONE 25 MG/G
CREAM TOPICAL 2 TIMES DAILY
Start: 2025-03-31

## 2025-03-31 NOTE — PATIENT INSTRUCTIONS
[facility]   [address], [address], [place], [place] [address] [phone]  [facility]

## 2025-03-31 NOTE — PROGRESS NOTES
Diabetic Foot Exam    Patient's shoes and socks removed.    Right Foot/Ankle   Right Foot Inspection  Skin Exam: skin normal and skin intact. No dry skin, no warmth, no callus, no erythema, no maceration, no abnormal color, no pre-ulcer, no ulcer and no callus.     Toe Exam: ROM and strength within normal limits.     Sensory   Vibration: intact  Proprioception: intact  Monofilament testing: intact    Vascular  Capillary refills: < 3 seconds  The right DP pulse is 2+. The right PT pulse is 2+.     Left Foot/Ankle  Left Foot Inspection  Skin Exam: skin normal and skin intact. No dry skin, no warmth, no erythema, no maceration, normal color, no pre-ulcer, no ulcer and no callus.     Toe Exam: ROM and strength within normal limits.     Sensory   Vibration: intact  Proprioception: intact  Monofilament testing: intact    Vascular  Capillary refills: < 3 seconds  The left DP pulse is 2+. The left PT pulse is 2+.     Assign Risk Category  No deformity present  No loss of protective sensation  No weak pulses  Risk: 0      Name: Sari Hernandez      : 1990      MRN: [id number]  Encounter Provider: Mundo Worley MD  Encounter Date: 3/31/2025   Encounter department: [facility]  :  Assessment & Plan  Type 2 diabetes mellitus with hyperglycemia, without long-term current use of insulin ([facility])    Lab Results   Component Value Date    HGBA1C 10.6 (A) 2025     Not at goal. Jardiance went up in price and could no longer afford it. Once the weight started to increase, she became more discourage and reverted but to her previous diet. Discussed dietary changes. Recent diet influenced by her mood. Did shares she's felt more depressed lately and food has been comforting. She is addressing this and plans. Resubmit for jardiance 25 mg daily and check labs prior to next appt.  coupon provided   Orders:  •  POCT hemoglobin A1c  •  Empagliflozin (Jardiance) 25 MG TABS; Take 1 tablet (25 mg total)  by mouth daily  •  Comprehensive metabolic panel; Future  •  Lipid panel; Future    Elevated blood pressure reading  At goal today        Situational depression  Depression Screening Follow-up Plan: Patient's depression screening was positive with a PHQ-2 score of 4. Their PHQ-9 score was 9. Continue regular follow-up with their psychologist/therapist/psychiatrist who is managing their mental health condition(s). Patient's depressive symptoms likely due to other medical condition. Would recommend treatment of underlying condition. Will continue to monitor at next office visit.    Orders:  •  Comprehensive metabolic panel; Future  •  TSH, 3rd generation with Free T4 reflex; Future    Eczematous dermatitis of upper eyelids of both eyes    Orders:  •  hydrocortisone 2.5 % cream; Apply topically 2 (two) times a day  •  Ambulatory Referral to Dermatology; Future    Vitamin D deficiency         Iron deficiency anemia, unspecified iron deficiency anemia type  Know history which could be affecting her mood and lack of motivation  Orders:  •  CBC and differential; Future  •  Iron Panel (Includes Ferritin, Iron Sat%, Iron, and TIBC); Future    B12 deficiency  Established diagnosis and may be causing paresthesia   Orders:  •  CBC and differential; Future  •  Vitamin B12; Future    Nocturnal leg cramps  Possibly PEPITO for diabetic neuropathy. Will check labs. Diabetes control is imperative       Paresthesia of both lower extremities  Possibly neuropathy or a vitamin deficiency or anemia  Orders:  •  Magnesium; Future  •  Vitamin B12; Future    Severe major depressive disorder (HCC)  Depression Screening Follow-up Plan: Patient's depression screening was positive with a PHQ-2 score of 4. Their PHQ-9 score was 9. Continue regular follow-up with their psychologist/therapist/psychiatrist who is managing their mental health condition(s). Patient's depressive symptoms likely due to other medical condition. Would recommend treatment of  "underlying condition. Will continue to monitor at next office visit.         Necrobiosis diabeticorum (HCC)  Chronic and on the lower extremity. Referral to dermatology   Orders:  •  Ambulatory Referral to Dermatology; Future    Skin tag  One on her face she would like removed. Referred to plastics for the skin tag and dermatology for from lower extremity hyperpigmented atrophic lesion.   Orders:  •  Ambulatory Referral to Dermatology; Future  •  Ambulatory Referral to Plastic Surgery; Future          Depression Screening and Follow-up Plan: Patient's depression screening was positive with a PHQ-2 score of 4. Their PHQ-9 score was 9.   Continue regular follow-up with their mental health provider who is managing their mental health condition(s). Depression likely due to other medical condition. Will treat underlying condition.   History of Present Illness   Jardaince went from 40--> 140 $   Couldn't continue it and was discontinued in Jan   Still taking Metformin 1000 mg BID  Still taking iron, vitamin D, and B12  Dealing with a lot of personal stressors  Experiencing pain in the feet, legs, and hands   Affects her sleep  Types most of the days for work    Tinglign in the hands      Review of Systems   Constitutional:  Positive for appetite change and unexpected weight change.   Gastrointestinal: Negative.    Skin:         Skin tags    Neurological:  Positive for numbness.   Psychiatric/Behavioral:  Positive for decreased concentration and dysphoric mood.        Objective   /80 (BP Location: Left arm, Patient Position: Sitting, Cuff Size: Large)   Pulse 96   Temp 99.7 °F (37.6 °C) (Tympanic)   Resp 17   Ht 5' 7\" (1.702 m)   Wt 127 kg (279 lb)   SpO2 98%   BMI 43.70 kg/m²      Physical Exam  Vitals reviewed.   Constitutional:       General: She is not in acute distress.     Appearance: She is not ill-appearing or toxic-appearing.   HENT:      Head: Normocephalic and atraumatic.   Cardiovascular:      Rate " and Rhythm: Normal rate and regular rhythm.      Pulses: no weak pulses.           Dorsalis pedis pulses are 2+ on the right side and 2+ on the left side.        Posterior tibial pulses are 2+ on the right side and 2+ on the left side.   Pulmonary:      Effort: Pulmonary effort is normal.   Feet:      Right foot:      Skin integrity: No ulcer, skin breakdown, erythema, warmth, callus or dry skin.      Left foot:      Skin integrity: No ulcer, skin breakdown, erythema, warmth, callus or dry skin.   Neurological:      Mental Status: She is alert and oriented to person, place, and time.   Psychiatric:         Attention and Perception: Attention normal.         Mood and Affect: Mood is depressed.         Speech: Speech normal.         Cognition and Memory: Cognition and memory normal.

## 2025-03-31 NOTE — ASSESSMENT & PLAN NOTE
Depression Screening Follow-up Plan: Patient's depression screening was positive with a PHQ-2 score of 4. Their PHQ-9 score was 9. Continue regular follow-up with their psychologist/therapist/psychiatrist who is managing their mental health condition(s). Patient's depressive symptoms likely due to other medical condition. Would recommend treatment of underlying condition. Will continue to monitor at next office visit.

## 2025-03-31 NOTE — ASSESSMENT & PLAN NOTE
Lab Results   Component Value Date    HGBA1C 10.6 (A) 03/31/2025     Not at goal. Jardiance went up in price and could no longer afford it. Once the weight started to increase, she became more discourage and reverted but to her previous diet. Discussed dietary changes. Recent diet influenced by her mood. Did shares she's felt more depressed lately and food has been comforting. She is addressing this and plans. Resubmit for jardiance 25 mg daily and check labs prior to next appt.  coupon provided   Orders:    POCT hemoglobin A1c    Empagliflozin (Jardiance) 25 MG TABS; Take 1 tablet (25 mg total) by mouth daily    Comprehensive metabolic panel; Future    Lipid panel; Future

## 2025-04-01 ENCOUNTER — TELEPHONE (OUTPATIENT)
Dept: PLASTIC SURGERY | Facility: CLINIC | Age: 35
End: 2025-04-01

## 2025-04-01 NOTE — TELEPHONE ENCOUNTER
DIVINEM to set up referral appt. Told pt to please give us a call back in order to get this set up.      If this pt calls back please schedule them with a PA.

## 2025-04-07 NOTE — TELEPHONE ENCOUNTER
Rec'd return call from patient.    Scheduled CONSULTATION with Abdulkadir on 4/30/2025 @ 4:00 pm at [facility] office.    Scheduled 4:00 NEW - patient unable to make 3:00 pm.    Patient aware of office location.  Patient aware to arrive 15 minutes prior.    Updated and attached referral.

## 2025-04-22 ENCOUNTER — APPOINTMENT (OUTPATIENT)
Dept: LAB | Facility: AMBULARY SURGERY CENTER | Age: 35
End: 2025-04-22
Payer: COMMERCIAL

## 2025-04-22 DIAGNOSIS — E11.65 TYPE 2 DIABETES MELLITUS WITH HYPERGLYCEMIA, WITHOUT LONG-TERM CURRENT USE OF INSULIN (HCC): ICD-10-CM

## 2025-04-22 DIAGNOSIS — F43.21 SITUATIONAL DEPRESSION: ICD-10-CM

## 2025-04-22 DIAGNOSIS — D50.9 IRON DEFICIENCY ANEMIA, UNSPECIFIED IRON DEFICIENCY ANEMIA TYPE: ICD-10-CM

## 2025-04-22 DIAGNOSIS — E53.8 B12 DEFICIENCY: ICD-10-CM

## 2025-04-22 DIAGNOSIS — R20.2 PARESTHESIA OF BOTH LOWER EXTREMITIES: ICD-10-CM

## 2025-04-22 LAB
ALBUMIN SERPL BCG-MCNC: 4.2 G/DL (ref 3.5–5)
ALP SERPL-CCNC: 74 U/L (ref 34–104)
ALT SERPL W P-5'-P-CCNC: 21 U/L (ref 7–52)
ANION GAP SERPL CALCULATED.3IONS-SCNC: 14 MMOL/L (ref 4–13)
AST SERPL W P-5'-P-CCNC: 18 U/L (ref 13–39)
BASOPHILS # BLD AUTO: 0.05 THOUSANDS/ÂΜL (ref 0–0.1)
BASOPHILS NFR BLD AUTO: 1 % (ref 0–1)
BILIRUB SERPL-MCNC: 0.42 MG/DL (ref 0.2–1)
BUN SERPL-MCNC: 7 MG/DL (ref 5–25)
CALCIUM SERPL-MCNC: 9 MG/DL (ref 8.4–10.2)
CHLORIDE SERPL-SCNC: 101 MMOL/L (ref 96–108)
CHOLEST SERPL-MCNC: 212 MG/DL (ref ?–200)
CO2 SERPL-SCNC: 25 MMOL/L (ref 21–32)
CREAT SERPL-MCNC: 0.57 MG/DL (ref 0.6–1.3)
EOSINOPHIL # BLD AUTO: 0.16 THOUSAND/ÂΜL (ref 0–0.61)
EOSINOPHIL NFR BLD AUTO: 2 % (ref 0–6)
ERYTHROCYTE [DISTWIDTH] IN BLOOD BY AUTOMATED COUNT: 13.1 % (ref 11.6–15.1)
FERRITIN SERPL-MCNC: 19 NG/ML (ref 30–307)
GFR SERPL CREATININE-BSD FRML MDRD: 121 ML/MIN/1.73SQ M
GLUCOSE P FAST SERPL-MCNC: 248 MG/DL (ref 65–99)
HCT VFR BLD AUTO: 47.3 % (ref 34.8–46.1)
HDLC SERPL-MCNC: 52 MG/DL
HGB BLD-MCNC: 15.2 G/DL (ref 11.5–15.4)
IMM GRANULOCYTES # BLD AUTO: 0.14 THOUSAND/UL (ref 0–0.2)
IMM GRANULOCYTES NFR BLD AUTO: 1 % (ref 0–2)
IRON SATN MFR SERPL: 11 % (ref 15–50)
IRON SERPL-MCNC: 46 UG/DL (ref 50–212)
LDLC SERPL CALC-MCNC: 143 MG/DL (ref 0–100)
LYMPHOCYTES # BLD AUTO: 2.99 THOUSANDS/ÂΜL (ref 0.6–4.47)
LYMPHOCYTES NFR BLD AUTO: 28 % (ref 14–44)
MAGNESIUM SERPL-MCNC: 1.6 MG/DL (ref 1.9–2.7)
MCH RBC QN AUTO: 26.2 PG (ref 26.8–34.3)
MCHC RBC AUTO-ENTMCNC: 32.1 G/DL (ref 31.4–37.4)
MCV RBC AUTO: 82 FL (ref 82–98)
MONOCYTES # BLD AUTO: 0.5 THOUSAND/ÂΜL (ref 0.17–1.22)
MONOCYTES NFR BLD AUTO: 5 % (ref 4–12)
NEUTROPHILS # BLD AUTO: 6.75 THOUSANDS/ÂΜL (ref 1.85–7.62)
NEUTS SEG NFR BLD AUTO: 63 % (ref 43–75)
NONHDLC SERPL-MCNC: 160 MG/DL
NRBC BLD AUTO-RTO: 0 /100 WBCS
PLATELET # BLD AUTO: 298 THOUSANDS/UL (ref 149–390)
PMV BLD AUTO: 10.9 FL (ref 8.9–12.7)
POTASSIUM SERPL-SCNC: 4 MMOL/L (ref 3.5–5.3)
PROT SERPL-MCNC: 6.7 G/DL (ref 6.4–8.4)
RBC # BLD AUTO: 5.8 MILLION/UL (ref 3.81–5.12)
SODIUM SERPL-SCNC: 140 MMOL/L (ref 135–147)
TIBC SERPL-MCNC: 429.8 UG/DL (ref 250–450)
TRANSFERRIN SERPL-MCNC: 307 MG/DL (ref 203–362)
TRIGL SERPL-MCNC: 87 MG/DL (ref ?–150)
TSH SERPL DL<=0.05 MIU/L-ACNC: 1.1 UIU/ML (ref 0.45–4.5)
UIBC SERPL-MCNC: 384 UG/DL (ref 155–355)
VIT B12 SERPL-MCNC: 302 PG/ML (ref 180–914)
WBC # BLD AUTO: 10.59 THOUSAND/UL (ref 4.31–10.16)

## 2025-04-22 PROCEDURE — 80061 LIPID PANEL: CPT

## 2025-04-22 PROCEDURE — 83735 ASSAY OF MAGNESIUM: CPT

## 2025-04-22 PROCEDURE — 36415 COLL VENOUS BLD VENIPUNCTURE: CPT

## 2025-04-22 PROCEDURE — 83540 ASSAY OF IRON: CPT

## 2025-04-22 PROCEDURE — 82607 VITAMIN B-12: CPT

## 2025-04-22 PROCEDURE — 84443 ASSAY THYROID STIM HORMONE: CPT

## 2025-04-22 PROCEDURE — 82728 ASSAY OF FERRITIN: CPT

## 2025-04-22 PROCEDURE — 83550 IRON BINDING TEST: CPT

## 2025-04-22 PROCEDURE — 85025 COMPLETE CBC W/AUTO DIFF WBC: CPT

## 2025-04-22 PROCEDURE — 80053 COMPREHEN METABOLIC PANEL: CPT

## 2025-04-30 ENCOUNTER — CONSULT (OUTPATIENT)
Age: 35
End: 2025-04-30

## 2025-04-30 VITALS
BODY MASS INDEX: 43.79 KG/M2 | HEIGHT: 67 IN | WEIGHT: 279 LBS | SYSTOLIC BLOOD PRESSURE: 130 MMHG | DIASTOLIC BLOOD PRESSURE: 82 MMHG

## 2025-04-30 DIAGNOSIS — L91.8 SKIN TAG: ICD-10-CM

## 2025-04-30 PROCEDURE — COSCON COSMETIC CONSULTATION: Performed by: PHYSICIAN ASSISTANT

## 2025-04-30 NOTE — PROGRESS NOTES
Consult - Plastic Surgery   Sari Hernandez 34 y.o. female MRN: [id number]  Unit/Bed#:  Encounter: [id number]      Consults     Assessment:  Multiple pedunculated skin tags of face and axillas  Plan:  Pt agreed to sharp excision of up to 10 skin tags today.    10 skin tags were excised among those around the eyes and axillas. Pt tolerated the procedure well. Bandaid to two that were bleeding. OK to remove tomorrow.   Followup as need. Call with any concerns.    Principal Problem: skin tags    HPI:   Sari Hernandez is a 34 y.o. year old female who presents with bothersome skin tags of her face and axillas.  She agreed to the cosmetic charge today to have them removed.  She does not smoke, does not take a blood thinner.        REVIEW OF SYSTEMS    GENERAL/CONSTITUTIONAL: The patient denies fever, fatigue, weakness, weight gain or weight loss.  HEAD, EYES, EARS, NOSE AND THROAT: Eyes - The patient denies pain, redness, loss of vision, double or blurred vision and denies wearing glasses. The patient denies ringing in the ears, nosebleeds sinusitis, post nasal drip. Also denies frequent sore throats, hoarseness, painful swallowing.  CARDIOVASCULAR: The patient denies chest pain, irregular heartbeats, palpitations, shortness of breath, heart murmurs, high blood pressure, cramps in his legs with walking, pain in his feet or toes at night or varicose veins.  RESPIRATORY: The patient denies chronic cough, wheezing or night sweats.  GASTROINTESTINAL: The patient denies decreased appetite, nausea, vomiting, diarrhea, constipation, blood in the stools.  GENITOURINARY: The patient denies difficult urination, pain or burning with urination, blood in the urine.  MUSCULOSKELETAL: The patient denies arm, thigh or calf cramps. No joint or muscle pain. No muscle weakness or tenderness. No joint swelling, neck pain, back pain or major orthopedic injuries.  SKIN AND BREASTS: The patient denies easy bruising, skin redness, skin rash,  hives.  NEUROLOGIC: The patient denies headache, dizziness, fainting, memory loss.  PSYCHIATRIC: The patient denies depression anxiety.  ENDOCRINE: The patient denies intolerance to hot or cold temperature, flushing, fingernail changes, increased thirst, increased salt intake or decreased sexual desire.  HEMATOLOGIC/LYMPHATIC: The patient denies anemia, bleeding tendency or clotting tendency.  ALLERGIC/IMMUNOLOGIC: The patient denies rhinitis, asthma, skin sensitivity, latex allergies or sensitivity.      Historical Information   Past Medical History:   Diagnosis Date    Anxiety     Depression     Hypertension 2021    Kidney stone 2016    Migraine     Obesity     PCOS (polycystic ovarian syndrome)      Past Surgical History:   Procedure Laterality Date    CHOLECYSTECTOMY      WISDOM TOOTH EXTRACTION       Social History   Social History     Substance and Sexual Activity   Alcohol Use Never     Social History     Substance and Sexual Activity   Drug Use Never     Social History     Tobacco Use   Smoking Status Never   Smokeless Tobacco Never     Family History:   Family History   Problem Relation Age of Onset    Diabetes Mother     Heart attack Mother     Heart disease Mother     Heart attack Father     Heart disease Father     Diabetes Father        Meds/Allergies     Current Outpatient Medications:     buPROPion (WELLBUTRIN XL) 150 mg 24 hr tablet, TAKE 1 TABLET BY MOUTH EVERY DAY IN THE MORNING, Disp: 90 tablet, Rfl: 1    Empagliflozin (Jardiance) 25 MG TABS, Take 1 tablet (25 mg total) by mouth daily, Disp: 90 tablet, Rfl: 1    fluticasone (FLONASE) 50 mcg/act nasal spray, 1 spray into each nostril daily, Disp: , Rfl:     hydrocortisone 2.5 % cream, Apply topically 2 (two) times a day, Disp: , Rfl:     metFORMIN (GLUCOPHAGE-XR) 500 mg 24 hr tablet, TAKE 1 TABLET BY MOUTH DAILY WITH DINNER X 7DAYS, THEN 1 TABLET 2 TIMES A DAY WITH MEALS FOR 7 DAYS. THEN 2 TABLETS 2 TIMES A DAY WITH MEALS FOR 14 DAYS., Disp: 77  "tablet, Rfl: 5    pantoprazole (PROTONIX) 40 mg tablet, TAKE 1 TABLET BY MOUTH EVERY DAY, Disp: 90 tablet, Rfl: 0       Objective     General appearance: alert and oriented, in no acute distress  Head: Normocephalic, without obvious abnormality, atraumatic  Eyes: EOMI, multiple pedunculated skin tags. (See photo)  Lungs: clear  Heart: regular rate and rhythm  Abdomen: normal findings: soft, non-tender  Extremities: no edema, redness or tenderness in the calves or thighs  Skin: bilateral axillary skin tags  Neurologic: Grossly normal    Lab Results:   Lab Results   Component Value Date    WBC 10.59 (H) 04/22/2025    HGB 15.2 04/22/2025    HCT 47.3 (H) 04/22/2025    MCV 82 04/22/2025     04/22/2025          No results found for: \"TISSUECULT\", \"WOUNDCULT\"      Imaging Studies:   No results found.    EKG, Pathology, and Other Studies:   No results found for: \"FINALDX\"    [unfilled]    Invasive Devices       None                     "

## 2025-05-12 ENCOUNTER — RESULTS FOLLOW-UP (OUTPATIENT)
Dept: FAMILY MEDICINE CLINIC | Facility: CLINIC | Age: 35
End: 2025-05-12

## 2025-05-12 DIAGNOSIS — E87.20 METABOLIC ACIDOSIS: ICD-10-CM

## 2025-05-12 DIAGNOSIS — D50.8 IRON DEFICIENCY ANEMIA SECONDARY TO INADEQUATE DIETARY IRON INTAKE: ICD-10-CM

## 2025-05-12 DIAGNOSIS — E11.65 TYPE 2 DIABETES MELLITUS WITH HYPERGLYCEMIA, WITHOUT LONG-TERM CURRENT USE OF INSULIN (HCC): Primary | ICD-10-CM
